# Patient Record
Sex: MALE | Race: WHITE | NOT HISPANIC OR LATINO | Employment: OTHER | URBAN - METROPOLITAN AREA
[De-identification: names, ages, dates, MRNs, and addresses within clinical notes are randomized per-mention and may not be internally consistent; named-entity substitution may affect disease eponyms.]

---

## 2019-09-20 ENCOUNTER — APPOINTMENT (OUTPATIENT)
Dept: RADIOLOGY | Facility: MEDICAL CENTER | Age: 64
DRG: 190 | End: 2019-09-20
Attending: INTERNAL MEDICINE
Payer: MEDICARE

## 2019-09-20 ENCOUNTER — HOSPITAL ENCOUNTER (INPATIENT)
Facility: MEDICAL CENTER | Age: 64
LOS: 1 days | DRG: 190 | End: 2019-09-22
Attending: EMERGENCY MEDICINE | Admitting: HOSPITALIST
Payer: MEDICARE

## 2019-09-20 ENCOUNTER — APPOINTMENT (OUTPATIENT)
Dept: RADIOLOGY | Facility: MEDICAL CENTER | Age: 64
DRG: 190 | End: 2019-09-20
Attending: EMERGENCY MEDICINE
Payer: MEDICARE

## 2019-09-20 DIAGNOSIS — J44.1 ACUTE EXACERBATION OF CHRONIC OBSTRUCTIVE PULMONARY DISEASE (COPD) (HCC): ICD-10-CM

## 2019-09-20 DIAGNOSIS — I10 ESSENTIAL HYPERTENSION: ICD-10-CM

## 2019-09-20 DIAGNOSIS — J96.01 ACUTE HYPOXEMIC RESPIRATORY FAILURE (HCC): ICD-10-CM

## 2019-09-20 DIAGNOSIS — E87.6 HYPOKALEMIA: ICD-10-CM

## 2019-09-20 DIAGNOSIS — I50.9 ACUTE CONGESTIVE HEART FAILURE, UNSPECIFIED HEART FAILURE TYPE (HCC): ICD-10-CM

## 2019-09-20 DIAGNOSIS — J44.1 COPD WITH EXACERBATION (HCC): ICD-10-CM

## 2019-09-20 DIAGNOSIS — Z72.0 TOBACCO ABUSE: ICD-10-CM

## 2019-09-20 DIAGNOSIS — J96.01 ACUTE RESPIRATORY FAILURE WITH HYPOXIA (HCC): ICD-10-CM

## 2019-09-20 PROBLEM — D75.89 MACROCYTOSIS WITHOUT ANEMIA: Status: ACTIVE | Noted: 2019-09-20

## 2019-09-20 PROBLEM — R79.89 ELEVATED TROPONIN: Status: ACTIVE | Noted: 2019-09-20

## 2019-09-20 PROBLEM — F11.11 HISTORY OF HEROIN ABUSE (HCC): Status: ACTIVE | Noted: 2019-09-20

## 2019-09-20 PROBLEM — E88.09 HYPERPROTEINEMIA: Status: ACTIVE | Noted: 2019-09-20

## 2019-09-20 PROBLEM — R74.01 TRANSAMINITIS: Status: ACTIVE | Noted: 2019-09-20

## 2019-09-20 PROBLEM — F10.29 ALCOHOL DEPENDENCE WITH UNSPECIFIED ALCOHOL-INDUCED DISORDER (HCC): Status: ACTIVE | Noted: 2019-09-20

## 2019-09-20 PROBLEM — E87.20 LACTIC ACIDOSIS: Status: ACTIVE | Noted: 2019-09-20

## 2019-09-20 LAB
ALBUMIN SERPL BCP-MCNC: 3.9 G/DL (ref 3.2–4.9)
ALBUMIN/GLOB SERPL: 0.9 G/DL
ALP SERPL-CCNC: 161 U/L (ref 30–99)
ALT SERPL-CCNC: 30 U/L (ref 2–50)
AMPHET UR QL SCN: NEGATIVE
ANION GAP SERPL CALC-SCNC: 14 MMOL/L (ref 0–11.9)
APPEARANCE UR: CLEAR
APTT PPP: 31.9 SEC (ref 24.7–36)
AST SERPL-CCNC: 114 U/L (ref 12–45)
BACTERIA #/AREA URNS HPF: NEGATIVE /HPF
BARBITURATES UR QL SCN: NEGATIVE
BASOPHILS # BLD AUTO: 1.2 % (ref 0–1.8)
BASOPHILS # BLD: 0.07 K/UL (ref 0–0.12)
BENZODIAZ UR QL SCN: NEGATIVE
BILIRUB SERPL-MCNC: 1.8 MG/DL (ref 0.1–1.5)
BILIRUB UR QL STRIP.AUTO: NEGATIVE
BUN SERPL-MCNC: 10 MG/DL (ref 8–22)
BZE UR QL SCN: NEGATIVE
CALCIUM SERPL-MCNC: 8.9 MG/DL (ref 8.5–10.5)
CANNABINOIDS UR QL SCN: POSITIVE
CHLORIDE SERPL-SCNC: 96 MMOL/L (ref 96–112)
CO2 SERPL-SCNC: 25 MMOL/L (ref 20–33)
COLOR UR: YELLOW
CREAT SERPL-MCNC: 0.83 MG/DL (ref 0.5–1.4)
EKG IMPRESSION: NORMAL
EOSINOPHIL # BLD AUTO: 0.08 K/UL (ref 0–0.51)
EOSINOPHIL NFR BLD: 1.4 % (ref 0–6.9)
EPI CELLS #/AREA URNS HPF: NEGATIVE /HPF
ERYTHROCYTE [DISTWIDTH] IN BLOOD BY AUTOMATED COUNT: 50.9 FL (ref 35.9–50)
GLOBULIN SER CALC-MCNC: 4.4 G/DL (ref 1.9–3.5)
GLUCOSE SERPL-MCNC: 186 MG/DL (ref 65–99)
GLUCOSE UR STRIP.AUTO-MCNC: 100 MG/DL
HAV IGM SERPL QL IA: NEGATIVE
HBV CORE IGM SER QL: NEGATIVE
HBV SURFACE AG SER QL: NEGATIVE
HCT VFR BLD AUTO: 39.4 % (ref 42–52)
HCV AB SER QL: REACTIVE
HGB BLD-MCNC: 13.4 G/DL (ref 14–18)
HIV 1+2 AB+HIV1 P24 AG SERPL QL IA: NON REACTIVE
HYALINE CASTS #/AREA URNS LPF: ABNORMAL /LPF
IMM GRANULOCYTES # BLD AUTO: 0.01 K/UL (ref 0–0.11)
IMM GRANULOCYTES NFR BLD AUTO: 0.2 % (ref 0–0.9)
INR PPP: 1.26 (ref 0.87–1.13)
KETONES UR STRIP.AUTO-MCNC: NEGATIVE MG/DL
LACTATE BLD-SCNC: 2.5 MMOL/L (ref 0.5–2)
LACTATE BLD-SCNC: 2.5 MMOL/L (ref 0.5–2)
LACTATE BLD-SCNC: 3 MMOL/L (ref 0.5–2)
LACTATE BLD-SCNC: 3.2 MMOL/L (ref 0.5–2)
LEUKOCYTE ESTERASE UR QL STRIP.AUTO: NEGATIVE
LYMPHOCYTES # BLD AUTO: 1.95 K/UL (ref 1–4.8)
LYMPHOCYTES NFR BLD: 34.1 % (ref 22–41)
MAGNESIUM SERPL-MCNC: 1.3 MG/DL (ref 1.5–2.5)
MCH RBC QN AUTO: 34.4 PG (ref 27–33)
MCHC RBC AUTO-ENTMCNC: 34 G/DL (ref 33.7–35.3)
MCV RBC AUTO: 101 FL (ref 81.4–97.8)
METHADONE UR QL SCN: NEGATIVE
MICRO URNS: ABNORMAL
MONOCYTES # BLD AUTO: 0.56 K/UL (ref 0–0.85)
MONOCYTES NFR BLD AUTO: 9.8 % (ref 0–13.4)
NEUTROPHILS # BLD AUTO: 3.05 K/UL (ref 1.82–7.42)
NEUTROPHILS NFR BLD: 53.3 % (ref 44–72)
NITRITE UR QL STRIP.AUTO: NEGATIVE
NRBC # BLD AUTO: 0 K/UL
NRBC BLD-RTO: 0 /100 WBC
NT-PROBNP SERPL IA-MCNC: 2416 PG/ML (ref 0–125)
OPIATES UR QL SCN: NEGATIVE
OXYCODONE UR QL SCN: NEGATIVE
PCP UR QL SCN: NEGATIVE
PH UR STRIP.AUTO: 7.5 [PH] (ref 5–8)
PHOSPHATE SERPL-MCNC: 4 MG/DL (ref 2.5–4.5)
PLATELET # BLD AUTO: 152 K/UL (ref 164–446)
PMV BLD AUTO: 11.1 FL (ref 9–12.9)
POTASSIUM SERPL-SCNC: 3.1 MMOL/L (ref 3.6–5.5)
PROCALCITONIN SERPL-MCNC: <0.05 NG/ML
PROPOXYPH UR QL SCN: NEGATIVE
PROT SERPL-MCNC: 8.3 G/DL (ref 6–8.2)
PROT UR QL STRIP: NEGATIVE MG/DL
PROTHROMBIN TIME: 16.1 SEC (ref 12–14.6)
RBC # BLD AUTO: 3.9 M/UL (ref 4.7–6.1)
RBC # URNS HPF: ABNORMAL /HPF
RBC UR QL AUTO: ABNORMAL
SODIUM SERPL-SCNC: 135 MMOL/L (ref 135–145)
SP GR UR STRIP.AUTO: 1.01
TROPONIN T SERPL-MCNC: 26 NG/L (ref 6–19)
TROPONIN T SERPL-MCNC: 43 NG/L (ref 6–19)
UROBILINOGEN UR STRIP.AUTO-MCNC: 1 MG/DL
WBC # BLD AUTO: 5.7 K/UL (ref 4.8–10.8)
WBC #/AREA URNS HPF: ABNORMAL /HPF

## 2019-09-20 PROCEDURE — 94640 AIRWAY INHALATION TREATMENT: CPT

## 2019-09-20 PROCEDURE — 36415 COLL VENOUS BLD VENIPUNCTURE: CPT

## 2019-09-20 PROCEDURE — 700111 HCHG RX REV CODE 636 W/ 250 OVERRIDE (IP): Performed by: EMERGENCY MEDICINE

## 2019-09-20 PROCEDURE — A9270 NON-COVERED ITEM OR SERVICE: HCPCS | Performed by: INTERNAL MEDICINE

## 2019-09-20 PROCEDURE — HZ2ZZZZ DETOXIFICATION SERVICES FOR SUBSTANCE ABUSE TREATMENT: ICD-10-PCS | Performed by: INTERNAL MEDICINE

## 2019-09-20 PROCEDURE — 80074 ACUTE HEPATITIS PANEL: CPT

## 2019-09-20 PROCEDURE — 700102 HCHG RX REV CODE 250 W/ 637 OVERRIDE(OP): Performed by: INTERNAL MEDICINE

## 2019-09-20 PROCEDURE — G0378 HOSPITAL OBSERVATION PER HR: HCPCS

## 2019-09-20 PROCEDURE — 700101 HCHG RX REV CODE 250: Performed by: INTERNAL MEDICINE

## 2019-09-20 PROCEDURE — 96372 THER/PROPH/DIAG INJ SC/IM: CPT

## 2019-09-20 PROCEDURE — 94668 MNPJ CHEST WALL SBSQ: CPT

## 2019-09-20 PROCEDURE — 93970 EXTREMITY STUDY: CPT

## 2019-09-20 PROCEDURE — 94667 MNPJ CHEST WALL 1ST: CPT

## 2019-09-20 PROCEDURE — 96374 THER/PROPH/DIAG INJ IV PUSH: CPT

## 2019-09-20 PROCEDURE — 96365 THER/PROPH/DIAG IV INF INIT: CPT

## 2019-09-20 PROCEDURE — 83605 ASSAY OF LACTIC ACID: CPT | Mod: 91

## 2019-09-20 PROCEDURE — 700111 HCHG RX REV CODE 636 W/ 250 OVERRIDE (IP): Performed by: INTERNAL MEDICINE

## 2019-09-20 PROCEDURE — 85730 THROMBOPLASTIN TIME PARTIAL: CPT

## 2019-09-20 PROCEDURE — 87522 HEPATITIS C REVRS TRNSCRPJ: CPT

## 2019-09-20 PROCEDURE — 700105 HCHG RX REV CODE 258: Performed by: INTERNAL MEDICINE

## 2019-09-20 PROCEDURE — 94760 N-INVAS EAR/PLS OXIMETRY 1: CPT

## 2019-09-20 PROCEDURE — 81001 URINALYSIS AUTO W/SCOPE: CPT

## 2019-09-20 PROCEDURE — 84145 PROCALCITONIN (PCT): CPT

## 2019-09-20 PROCEDURE — 84484 ASSAY OF TROPONIN QUANT: CPT

## 2019-09-20 PROCEDURE — 700102 HCHG RX REV CODE 250 W/ 637 OVERRIDE(OP): Performed by: EMERGENCY MEDICINE

## 2019-09-20 PROCEDURE — 80053 COMPREHEN METABOLIC PANEL: CPT

## 2019-09-20 PROCEDURE — G0475 HIV COMBINATION ASSAY: HCPCS

## 2019-09-20 PROCEDURE — 96366 THER/PROPH/DIAG IV INF ADDON: CPT

## 2019-09-20 PROCEDURE — 83880 ASSAY OF NATRIURETIC PEPTIDE: CPT

## 2019-09-20 PROCEDURE — 99220 PR INITIAL OBSERVATION CARE,LEVL III: CPT | Performed by: INTERNAL MEDICINE

## 2019-09-20 PROCEDURE — 83735 ASSAY OF MAGNESIUM: CPT

## 2019-09-20 PROCEDURE — 71275 CT ANGIOGRAPHY CHEST: CPT

## 2019-09-20 PROCEDURE — 93005 ELECTROCARDIOGRAM TRACING: CPT | Performed by: EMERGENCY MEDICINE

## 2019-09-20 PROCEDURE — 80307 DRUG TEST PRSMV CHEM ANLYZR: CPT

## 2019-09-20 PROCEDURE — 87040 BLOOD CULTURE FOR BACTERIA: CPT | Mod: 91

## 2019-09-20 PROCEDURE — A9270 NON-COVERED ITEM OR SERVICE: HCPCS | Performed by: EMERGENCY MEDICINE

## 2019-09-20 PROCEDURE — 85610 PROTHROMBIN TIME: CPT

## 2019-09-20 PROCEDURE — 96375 TX/PRO/DX INJ NEW DRUG ADDON: CPT

## 2019-09-20 PROCEDURE — 71045 X-RAY EXAM CHEST 1 VIEW: CPT

## 2019-09-20 PROCEDURE — 76705 ECHO EXAM OF ABDOMEN: CPT

## 2019-09-20 PROCEDURE — 85025 COMPLETE CBC W/AUTO DIFF WBC: CPT

## 2019-09-20 PROCEDURE — 99285 EMERGENCY DEPT VISIT HI MDM: CPT

## 2019-09-20 PROCEDURE — 700101 HCHG RX REV CODE 250: Performed by: EMERGENCY MEDICINE

## 2019-09-20 PROCEDURE — 700117 HCHG RX CONTRAST REV CODE 255: Performed by: INTERNAL MEDICINE

## 2019-09-20 PROCEDURE — 84100 ASSAY OF PHOSPHORUS: CPT

## 2019-09-20 RX ORDER — ENALAPRILAT 1.25 MG/ML
1.25 INJECTION INTRAVENOUS EVERY 6 HOURS PRN
Status: DISCONTINUED | OUTPATIENT
Start: 2019-09-20 | End: 2019-09-22 | Stop reason: HOSPADM

## 2019-09-20 RX ORDER — AMOXICILLIN 250 MG
2 CAPSULE ORAL 2 TIMES DAILY
Status: DISCONTINUED | OUTPATIENT
Start: 2019-09-20 | End: 2019-09-22 | Stop reason: HOSPADM

## 2019-09-20 RX ORDER — BUPRENORPHINE HYDROCHLORIDE AND NALOXONE HYDROCHLORIDE DIHYDRATE 8; 2 MG/1; MG/1
1 TABLET SUBLINGUAL 2 TIMES DAILY
Status: DISCONTINUED | OUTPATIENT
Start: 2019-09-20 | End: 2019-09-22 | Stop reason: HOSPADM

## 2019-09-20 RX ORDER — POTASSIUM CHLORIDE 20 MEQ/1
20 TABLET, EXTENDED RELEASE ORAL ONCE
Status: COMPLETED | OUTPATIENT
Start: 2019-09-20 | End: 2019-09-20

## 2019-09-20 RX ORDER — CLONIDINE HYDROCHLORIDE 0.1 MG/1
0.1 TABLET ORAL
Status: DISCONTINUED | OUTPATIENT
Start: 2019-09-20 | End: 2019-09-22 | Stop reason: HOSPADM

## 2019-09-20 RX ORDER — MAGNESIUM SULFATE HEPTAHYDRATE 40 MG/ML
4 INJECTION, SOLUTION INTRAVENOUS ONCE
Status: COMPLETED | OUTPATIENT
Start: 2019-09-20 | End: 2019-09-21

## 2019-09-20 RX ORDER — ACETAMINOPHEN 325 MG/1
650 TABLET ORAL EVERY 6 HOURS PRN
Status: DISCONTINUED | OUTPATIENT
Start: 2019-09-20 | End: 2019-09-22 | Stop reason: HOSPADM

## 2019-09-20 RX ORDER — LISINOPRIL 5 MG/1
10 TABLET ORAL DAILY
Status: DISCONTINUED | OUTPATIENT
Start: 2019-09-20 | End: 2019-09-22 | Stop reason: HOSPADM

## 2019-09-20 RX ORDER — SUCRALFATE ORAL 1 G/10ML
1 SUSPENSION ORAL EVERY 6 HOURS
Status: DISCONTINUED | OUTPATIENT
Start: 2019-09-20 | End: 2019-09-22 | Stop reason: HOSPADM

## 2019-09-20 RX ORDER — BISACODYL 10 MG
10 SUPPOSITORY, RECTAL RECTAL
Status: DISCONTINUED | OUTPATIENT
Start: 2019-09-20 | End: 2019-09-22 | Stop reason: HOSPADM

## 2019-09-20 RX ORDER — ASPIRIN 300 MG/1
300 SUPPOSITORY RECTAL DAILY
Status: DISCONTINUED | OUTPATIENT
Start: 2019-09-20 | End: 2019-09-22 | Stop reason: HOSPADM

## 2019-09-20 RX ORDER — GABAPENTIN 300 MG/1
600 CAPSULE ORAL 3 TIMES DAILY
Status: DISCONTINUED | OUTPATIENT
Start: 2019-09-20 | End: 2019-09-22 | Stop reason: HOSPADM

## 2019-09-20 RX ORDER — BUPRENORPHINE HYDROCHLORIDE AND NALOXONE HYDROCHLORIDE DIHYDRATE 8; 2 MG/1; MG/1
1 TABLET SUBLINGUAL 2 TIMES DAILY
COMMUNITY

## 2019-09-20 RX ORDER — LORAZEPAM 2 MG/1
2 TABLET ORAL
Status: DISCONTINUED | OUTPATIENT
Start: 2019-09-20 | End: 2019-09-22 | Stop reason: HOSPADM

## 2019-09-20 RX ORDER — LORAZEPAM 1 MG/1
0.5 TABLET ORAL EVERY 4 HOURS PRN
Status: DISCONTINUED | OUTPATIENT
Start: 2019-09-20 | End: 2019-09-22 | Stop reason: HOSPADM

## 2019-09-20 RX ORDER — SODIUM CHLORIDE, SODIUM LACTATE, POTASSIUM CHLORIDE, AND CALCIUM CHLORIDE .6; .31; .03; .02 G/100ML; G/100ML; G/100ML; G/100ML
500 INJECTION, SOLUTION INTRAVENOUS
Status: DISCONTINUED | OUTPATIENT
Start: 2019-09-20 | End: 2019-09-22 | Stop reason: HOSPADM

## 2019-09-20 RX ORDER — LORAZEPAM 2 MG/ML
0.5 INJECTION INTRAMUSCULAR EVERY 4 HOURS PRN
Status: DISCONTINUED | OUTPATIENT
Start: 2019-09-20 | End: 2019-09-22 | Stop reason: HOSPADM

## 2019-09-20 RX ORDER — GABAPENTIN 600 MG/1
600 TABLET ORAL 3 TIMES DAILY
COMMUNITY

## 2019-09-20 RX ORDER — LORAZEPAM 2 MG/1
4 TABLET ORAL
Status: DISCONTINUED | OUTPATIENT
Start: 2019-09-20 | End: 2019-09-22 | Stop reason: HOSPADM

## 2019-09-20 RX ORDER — DOXYCYCLINE 100 MG/1
100 TABLET ORAL EVERY 12 HOURS
Status: DISCONTINUED | OUTPATIENT
Start: 2019-09-20 | End: 2019-09-22 | Stop reason: HOSPADM

## 2019-09-20 RX ORDER — LORAZEPAM 1 MG/1
1 TABLET ORAL EVERY 4 HOURS PRN
Status: DISCONTINUED | OUTPATIENT
Start: 2019-09-20 | End: 2019-09-22 | Stop reason: HOSPADM

## 2019-09-20 RX ORDER — ONDANSETRON 2 MG/ML
4 INJECTION INTRAMUSCULAR; INTRAVENOUS EVERY 4 HOURS PRN
Status: DISCONTINUED | OUTPATIENT
Start: 2019-09-20 | End: 2019-09-20

## 2019-09-20 RX ORDER — LORAZEPAM 2 MG/ML
1.5 INJECTION INTRAMUSCULAR
Status: DISCONTINUED | OUTPATIENT
Start: 2019-09-20 | End: 2019-09-22 | Stop reason: HOSPADM

## 2019-09-20 RX ORDER — LISINOPRIL 10 MG/1
10 TABLET ORAL ONCE
Status: COMPLETED | OUTPATIENT
Start: 2019-09-20 | End: 2019-09-20

## 2019-09-20 RX ORDER — ASPIRIN 81 MG/1
324 TABLET, CHEWABLE ORAL DAILY
Status: DISCONTINUED | OUTPATIENT
Start: 2019-09-20 | End: 2019-09-22 | Stop reason: HOSPADM

## 2019-09-20 RX ORDER — CLONIDINE 0.3 MG/24H
1 PATCH, EXTENDED RELEASE TRANSDERMAL
COMMUNITY
End: 2019-09-20

## 2019-09-20 RX ORDER — ONDANSETRON 4 MG/1
4 TABLET, ORALLY DISINTEGRATING ORAL EVERY 4 HOURS PRN
Status: DISCONTINUED | OUTPATIENT
Start: 2019-09-20 | End: 2019-09-20

## 2019-09-20 RX ORDER — CLONIDINE HYDROCHLORIDE 0.3 MG/1
0.3 TABLET ORAL 2 TIMES DAILY
COMMUNITY

## 2019-09-20 RX ORDER — IPRATROPIUM BROMIDE AND ALBUTEROL SULFATE 2.5; .5 MG/3ML; MG/3ML
3 SOLUTION RESPIRATORY (INHALATION)
Status: DISCONTINUED | OUTPATIENT
Start: 2019-09-20 | End: 2019-09-21

## 2019-09-20 RX ORDER — NICOTINE 21 MG/24HR
14 PATCH, TRANSDERMAL 24 HOURS TRANSDERMAL
Status: DISCONTINUED | OUTPATIENT
Start: 2019-09-20 | End: 2019-09-22 | Stop reason: HOSPADM

## 2019-09-20 RX ORDER — THIAMINE MONONITRATE (VIT B1) 100 MG
100 TABLET ORAL DAILY
Status: DISCONTINUED | OUTPATIENT
Start: 2019-09-21 | End: 2019-09-22 | Stop reason: HOSPADM

## 2019-09-20 RX ORDER — PREDNISONE 50 MG/1
50 TABLET ORAL DAILY
Status: DISCONTINUED | OUTPATIENT
Start: 2019-09-20 | End: 2019-09-22 | Stop reason: HOSPADM

## 2019-09-20 RX ORDER — CLONIDINE HYDROCHLORIDE 0.1 MG/1
0.3 TABLET ORAL 2 TIMES DAILY
Status: DISCONTINUED | OUTPATIENT
Start: 2019-09-20 | End: 2019-09-22 | Stop reason: HOSPADM

## 2019-09-20 RX ORDER — POLYETHYLENE GLYCOL 3350 17 G/17G
1 POWDER, FOR SOLUTION ORAL
Status: DISCONTINUED | OUTPATIENT
Start: 2019-09-20 | End: 2019-09-22 | Stop reason: HOSPADM

## 2019-09-20 RX ORDER — IPRATROPIUM BROMIDE AND ALBUTEROL SULFATE 2.5; .5 MG/3ML; MG/3ML
3 SOLUTION RESPIRATORY (INHALATION)
Status: DISCONTINUED | OUTPATIENT
Start: 2019-09-20 | End: 2019-09-20

## 2019-09-20 RX ORDER — LORAZEPAM 2 MG/ML
1 INJECTION INTRAMUSCULAR
Status: DISCONTINUED | OUTPATIENT
Start: 2019-09-20 | End: 2019-09-22 | Stop reason: HOSPADM

## 2019-09-20 RX ORDER — OMEPRAZOLE 20 MG/1
20 CAPSULE, DELAYED RELEASE ORAL 2 TIMES DAILY
Status: DISCONTINUED | OUTPATIENT
Start: 2019-09-20 | End: 2019-09-22 | Stop reason: HOSPADM

## 2019-09-20 RX ORDER — ASPIRIN 325 MG
325 TABLET ORAL DAILY
Status: DISCONTINUED | OUTPATIENT
Start: 2019-09-20 | End: 2019-09-22 | Stop reason: HOSPADM

## 2019-09-20 RX ORDER — METHYLPREDNISOLONE SODIUM SUCCINATE 125 MG/2ML
125 INJECTION, POWDER, LYOPHILIZED, FOR SOLUTION INTRAMUSCULAR; INTRAVENOUS ONCE
Status: COMPLETED | OUTPATIENT
Start: 2019-09-20 | End: 2019-09-20

## 2019-09-20 RX ORDER — FOLIC ACID 1 MG/1
1 TABLET ORAL DAILY
Status: DISCONTINUED | OUTPATIENT
Start: 2019-09-20 | End: 2019-09-22 | Stop reason: HOSPADM

## 2019-09-20 RX ORDER — POTASSIUM CHLORIDE 20 MEQ/1
40 TABLET, EXTENDED RELEASE ORAL EVERY 4 HOURS
Status: DISPENSED | OUTPATIENT
Start: 2019-09-20 | End: 2019-09-20

## 2019-09-20 RX ORDER — LORAZEPAM 2 MG/ML
2 INJECTION INTRAMUSCULAR
Status: DISCONTINUED | OUTPATIENT
Start: 2019-09-20 | End: 2019-09-22 | Stop reason: HOSPADM

## 2019-09-20 RX ORDER — LISINOPRIL 10 MG/1
10 TABLET ORAL DAILY
COMMUNITY

## 2019-09-20 RX ADMIN — GABAPENTIN 600 MG: 300 CAPSULE ORAL at 14:21

## 2019-09-20 RX ADMIN — DOXYCYCLINE 100 MG: 100 TABLET, FILM COATED ORAL at 18:19

## 2019-09-20 RX ADMIN — DOXYCYCLINE 100 MG: 100 TABLET, FILM COATED ORAL at 14:42

## 2019-09-20 RX ADMIN — BUPRENORPHINE HYDROCHLORIDE AND NALOXONE HYDROCHLORIDE DIHYDRATE 1 TABLET: 8; 2 TABLET SUBLINGUAL at 18:33

## 2019-09-20 RX ADMIN — ACETAMINOPHEN 650 MG: 325 TABLET, FILM COATED ORAL at 20:36

## 2019-09-20 RX ADMIN — THERA TABS 1 TABLET: TAB at 14:40

## 2019-09-20 RX ADMIN — ASPIRIN 81 MG 324 MG: 81 TABLET ORAL at 14:40

## 2019-09-20 RX ADMIN — SUCRALFATE 1 G: 1 SUSPENSION ORAL at 14:22

## 2019-09-20 RX ADMIN — LORAZEPAM 0.5 MG: 1 TABLET ORAL at 20:36

## 2019-09-20 RX ADMIN — OMEPRAZOLE 20 MG: 20 CAPSULE, DELAYED RELEASE ORAL at 14:41

## 2019-09-20 RX ADMIN — NICOTINE 14 MG: 14 PATCH TRANSDERMAL at 14:42

## 2019-09-20 RX ADMIN — MAGNESIUM SULFATE IN WATER 4 G: 40 INJECTION, SOLUTION INTRAVENOUS at 20:36

## 2019-09-20 RX ADMIN — SUCRALFATE 1 G: 1 SUSPENSION ORAL at 18:18

## 2019-09-20 RX ADMIN — CLONIDINE HYDROCHLORIDE 0.3 MG: 0.1 TABLET ORAL at 14:41

## 2019-09-20 RX ADMIN — POTASSIUM CHLORIDE 20 MEQ: 20 TABLET, EXTENDED RELEASE ORAL at 09:12

## 2019-09-20 RX ADMIN — SENNOSIDES, DOCUSATE SODIUM 2 TABLET: 50; 8.6 TABLET, FILM COATED ORAL at 18:20

## 2019-09-20 RX ADMIN — IPRATROPIUM BROMIDE AND ALBUTEROL SULFATE 3 ML: .5; 3 SOLUTION RESPIRATORY (INHALATION) at 19:28

## 2019-09-20 RX ADMIN — IPRATROPIUM BROMIDE AND ALBUTEROL SULFATE 3 ML: .5; 3 SOLUTION RESPIRATORY (INHALATION) at 14:26

## 2019-09-20 RX ADMIN — IOHEXOL 100 ML: 350 INJECTION, SOLUTION INTRAVENOUS at 16:05

## 2019-09-20 RX ADMIN — OMEPRAZOLE 20 MG: 20 CAPSULE, DELAYED RELEASE ORAL at 18:20

## 2019-09-20 RX ADMIN — LISINOPRIL 10 MG: 10 TABLET ORAL at 08:35

## 2019-09-20 RX ADMIN — CLONIDINE HYDROCHLORIDE 0.3 MG: 0.1 TABLET ORAL at 18:19

## 2019-09-20 RX ADMIN — METHYLPREDNISOLONE SODIUM SUCCINATE 125 MG: 125 INJECTION, POWDER, FOR SOLUTION INTRAMUSCULAR; INTRAVENOUS at 08:01

## 2019-09-20 RX ADMIN — ALBUTEROL SULFATE 2.5 MG: 2.5 SOLUTION RESPIRATORY (INHALATION) at 08:48

## 2019-09-20 RX ADMIN — LORAZEPAM 0.5 MG: 2 INJECTION INTRAMUSCULAR; INTRAVENOUS at 12:44

## 2019-09-20 RX ADMIN — PREDNISONE 50 MG: 50 TABLET ORAL at 14:42

## 2019-09-20 RX ADMIN — ENOXAPARIN SODIUM 40 MG: 100 INJECTION SUBCUTANEOUS at 14:45

## 2019-09-20 RX ADMIN — GABAPENTIN 600 MG: 300 CAPSULE ORAL at 18:20

## 2019-09-20 RX ADMIN — THIAMINE HYDROCHLORIDE 100 MG: 100 INJECTION, SOLUTION INTRAMUSCULAR; INTRAVENOUS at 17:22

## 2019-09-20 RX ADMIN — LISINOPRIL 10 MG: 5 TABLET ORAL at 10:45

## 2019-09-20 RX ADMIN — POTASSIUM CHLORIDE 40 MEQ: 20 TABLET, EXTENDED RELEASE ORAL at 14:22

## 2019-09-20 RX ADMIN — FOLIC ACID 1 MG: 1 TABLET ORAL at 14:46

## 2019-09-20 SDOH — HEALTH STABILITY: MENTAL HEALTH: HOW MANY STANDARD DRINKS CONTAINING ALCOHOL DO YOU HAVE ON A TYPICAL DAY?: 3 OR 4

## 2019-09-20 SDOH — HEALTH STABILITY: MENTAL HEALTH: HOW OFTEN DO YOU HAVE A DRINK CONTAINING ALCOHOL?: 4 OR MORE TIMES A WEEK

## 2019-09-20 SDOH — HEALTH STABILITY: MENTAL HEALTH: HOW OFTEN DO YOU HAVE 6 OR MORE DRINKS ON ONE OCCASION?: WEEKLY

## 2019-09-20 ASSESSMENT — COPD QUESTIONNAIRES
COPD SCREENING SCORE: 7
DURING THE PAST 4 WEEKS HOW MUCH DID YOU FEEL SHORT OF BREATH: NONE/LITTLE OF THE TIME
DO YOU EVER COUGH UP ANY MUCUS OR PHLEGM?: YES, A FEW DAYS A WEEK OR MONTH
HAVE YOU SMOKED AT LEAST 100 CIGARETTES IN YOUR ENTIRE LIFE: YES

## 2019-09-20 ASSESSMENT — ENCOUNTER SYMPTOMS
HEMOPTYSIS: 0
VOMITING: 0
ORTHOPNEA: 0
SPUTUM PRODUCTION: 1
BLOOD IN STOOL: 0
FLANK PAIN: 0
DEPRESSION: 0
DOUBLE VISION: 0
SHORTNESS OF BREATH: 1
FALLS: 0
PALPITATIONS: 0
CHILLS: 0
WHEEZING: 1
DIAPHORESIS: 0
BACK PAIN: 0
BLURRED VISION: 0
COUGH: 1
HEADACHES: 0
MYALGIAS: 1
NECK PAIN: 0
WEIGHT LOSS: 0
NERVOUS/ANXIOUS: 1
DIZZINESS: 0
NAUSEA: 0
ABDOMINAL PAIN: 0
CLAUDICATION: 0
PND: 0
FEVER: 0
HEARTBURN: 0
DIARRHEA: 0
CONSTIPATION: 0

## 2019-09-20 ASSESSMENT — LIFESTYLE VARIABLES
TOTAL SCORE: 8
ORIENTATION AND CLOUDING OF SENSORIUM: ORIENTED AND CAN DO SERIAL ADDITIONS
AGITATION: SOMEWHAT MORE THAN NORMAL ACTIVITY
TREMOR: *
HEADACHE, FULLNESS IN HEAD: NOT PRESENT
AGITATION: SOMEWHAT MORE THAN NORMAL ACTIVITY
TOTAL SCORE: 2
PAROXYSMAL SWEATS: NO SWEAT VISIBLE
DOES PATIENT WANT TO TALK TO SOMEONE ABOUT QUITTING: YES
TOTAL SCORE: 4
EVER_SMOKED: YES
NAUSEA AND VOMITING: MILD NAUSEA WITH NO VOMITING
NAUSEA AND VOMITING: NO NAUSEA AND NO VOMITING
AGITATION: SOMEWHAT MORE THAN NORMAL ACTIVITY
HEADACHE, FULLNESS IN HEAD: MILD
EVER HAD A DRINK FIRST THING IN THE MORNING TO STEADY YOUR NERVES TO GET RID OF A HANGOVER: YES
HAVE PEOPLE ANNOYED YOU BY CRITICIZING YOUR DRINKING: YES
ANXIETY: *
EVER_SMOKED: YES
AUDITORY DISTURBANCES: NOT PRESENT
SUBSTANCE_ABUSE: 1
ORIENTATION AND CLOUDING OF SENSORIUM: ORIENTED AND CAN DO SERIAL ADDITIONS
ANXIETY: *
PAROXYSMAL SWEATS: NO SWEAT VISIBLE
AUDITORY DISTURBANCES: NOT PRESENT
ANXIETY: MILDLY ANXIOUS
VISUAL DISTURBANCES: NOT PRESENT
VISUAL DISTURBANCES: NOT PRESENT
HOW MANY TIMES IN THE PAST YEAR HAVE YOU HAD 5 OR MORE DRINKS IN A DAY: 200
TREMOR: TREMOR NOT VISIBLE BUT CAN BE FELT, FINGERTIP TO FINGERTIP
TREMOR: NO TREMOR
NAUSEA AND VOMITING: NO NAUSEA AND NO VOMITING
ORIENTATION AND CLOUDING OF SENSORIUM: ORIENTED AND CAN DO SERIAL ADDITIONS
AUDITORY DISTURBANCES: NOT PRESENT
CONSUMPTION TOTAL: POSITIVE
AVERAGE NUMBER OF DAYS PER WEEK YOU HAVE A DRINK CONTAINING ALCOHOL: 7
TOTAL SCORE: 4
HAVE YOU EVER FELT YOU SHOULD CUT DOWN ON YOUR DRINKING: YES
DO YOU DRINK ALCOHOL: YES
TOTAL SCORE: 6
TOTAL SCORE: 4
HEADACHE, FULLNESS IN HEAD: NOT PRESENT
ON A TYPICAL DAY WHEN YOU DRINK ALCOHOL HOW MANY DRINKS DO YOU HAVE: 4
VISUAL DISTURBANCES: NOT PRESENT
PAROXYSMAL SWEATS: BARELY PERCEPTIBLE SWEATING. PALMS MOIST
EVER FELT BAD OR GUILTY ABOUT YOUR DRINKING: YES
DOES PATIENT WANT TO STOP DRINKING: YES

## 2019-09-20 NOTE — ED NOTES
Med Rec completed per patient and home pharmacy   Allergies reviewed  No ORAL antibiotics in last 14 days    Suboxone has been verified with pt's home pharmacy

## 2019-09-20 NOTE — ED NOTES
Rounded on pt, discussed POC, updated on wait status, answered questions, addressed needs, ensured call light within reach. Provided emotional support for pt.

## 2019-09-20 NOTE — H&P
Hospital Medicine History & Physical Note    Date of Service  9/20/2019    Primary Care Physician  Pcp Pt States None    Consultants  None    Code Status  FULL CODE     Chief Complaint  Shortness of breath     History of Presenting Illness  64 y.o. male who presented 9/20/2019 with Shortness of breath.     Patient presents to the emergency department today complaining of shortness of breath, cough.  Patient reports currently he is homeless, he is living and has a bed at the overflow shelter here in [facility].  Patient reports that he is always moving from one time to another, he moved here from [facility] and prior to that he was living in [place].  Prior to that he was in [facility].  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  Further, he reports underlying history of COPD, not on oxygen.  Reports history of hypertension, reports being permanently disabled from lumbar radiculopathy/sciatica and having chronic degenerative joint disease.  He denies any history of congestive heart failure.  Patient reports that last year he was hospitalized 6 times to hospital in [facility] for recurrent pneumonia.    At this time patient reports that he has been having a productive cough, productive of yellow-colored sputum over the last 2 days.  In addition over the last 24 hours he has been getting short of breath, developing dyspnea on exertion which is progressive.  Also over the last 24 hours he has noticed wheezing.  He denies any chest pain.  He denies any fevers but reports that he has felt chills.  Denies headaches.  Otherwise denies any lower extremity edema but reports pain and cramps in his lower extremities, more profound in the left lower extremity.  He came to [facility] by Timothy gallo.  Denies any personal history of DVT/pulmonary embolism.  Denies any pleuritic symptoms at this time.  Denies any personal history of coronary  artery disease/congestive heart failure.  Reports that he is an active smoker and smokes 5 cigarettes/day.  Otherwise his shortness of breath is more profound with ambulation/exertion.  No orthopnea or paroxysmal nocturnal dyspnea reported by the patient and he has not noticed any lower extremity edema.  Denies having any palpitations.  Has not noticed any chest pain.  Denies having any abdominal pain, diarrhea, constipation, GI/genitourinary complaints otherwise.    Concern regarding his symptoms and thought that he may be having a similar pneumonia as he had last year while he was in [place] and decided to present to the emergency department for further evaluation.  He reports following his discharge he would need evaluation by her primary care physician also because he is new in town.  He was diagnosed as an acute exacerbation of COPD with concerns for congestive heart failure and respiratory failure and hospital medicine was advised for evaluation for hospitalization needs.    Otherwise patient reports that he uses lisinopril, clonidine for his blood pressure is chronically on Suboxone.    He reports that he has a history of alcoholism, does not drink daily alcohol, denies history of withdrawal.  Reports that he drinks 1-2 beers per day.  Denies any illicit drugs of abuse, denies marijuana consumption, denies methamphetamine consumption or any other illicit drugs of abuse.  Ongoing nicotine dependence as reviewed above.    Review of Systems  Review of Systems   Constitutional: Positive for malaise/fatigue. Negative for chills, diaphoresis, fever and weight loss.   HENT: Negative for hearing loss and tinnitus.    Eyes: Negative for blurred vision and double vision.   Respiratory: Positive for cough, sputum production, shortness of breath and wheezing. Negative for hemoptysis.    Cardiovascular: Negative for chest pain, palpitations, orthopnea, claudication, leg swelling and PND.   Gastrointestinal: Negative for  abdominal pain, blood in stool, constipation, diarrhea, heartburn, melena, nausea and vomiting.   Genitourinary: Negative for dysuria, flank pain, frequency, hematuria and urgency.   Musculoskeletal: Positive for myalgias. Negative for back pain, falls, joint pain and neck pain.   Skin: Negative for itching and rash.   Neurological: Negative for dizziness and headaches.   Psychiatric/Behavioral: Positive for substance abuse (Alcohol - history herone). Negative for depression and suicidal ideas. The patient is nervous/anxious.        Past Medical History   has a past medical history of Chronic obstructive pulmonary disease (HCC), ETOH abuse, and Hypertension.    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 He has been smoking about 0.50 packs per day. He has never used smokeless tobacco. He reports that he drinks about 2.4 oz of alcohol per week. He reports that he does not use drugs.    Allergies  No Known Allergies    Medications  Prior to Admission Medications   Prescriptions Last Dose Informant Patient Reported? Taking?   B Complex Vitamins (VITAMIN B COMPLEX PO) ABOUT 1 WEEK AGO at OUT Patient Yes Yes   Sig: Take 1 Tab by mouth every day.   Multiple Vitamin (MULTI VITAMIN PO) ABOUT 1 WEEK AGO at OUT Patient Yes Yes   Sig: Take 1 Tab by mouth every day.   buprenorphine-naloxone (SUBOXONE) 8-2 MG SL Tab 9/20/2019 at AM Patient Yes Yes   Sig: Place 1 Tab under tongue 2 Times a Day.   cloNIDine (CATAPRESS) 0.3 MG Tab ABOUT 1 WEEK AGO at OUT Patient Yes Yes   Sig: Take 0.3 mg by mouth 2 times a day.   gabapentin (NEURONTIN) 600 MG tablet ABOUT 1 WEEK AGO at OUT Patient Yes Yes   Sig: Take 600 mg by mouth  3 times a day.   lisinopril (PRINIVIL) 10 MG Tab ABOUT 1 WEEK AGO at OUT Patient Yes Yes   Sig: Take 10 mg by mouth every day.      Facility-Administered Medications: None       Physical Exam  Temp:  [35.9 °C (96.7 °F)] 35.9 °C (96.7 °F)  Pulse:  [] 94  Resp:  [10-18] 11  BP: (152-186)/() 182/88  SpO2:  [92 %-99 %] 96 %    Physical Exam   Constitutional: He is oriented to person, place, and time. He appears well-developed and well-nourished. No distress.   HENT:   Head: Normocephalic.   Mouth/Throat: Oropharynx is clear and moist. No oropharyngeal exudate.   Eyes: Pupils are equal, round, and reactive to light. Conjunctivae and EOM are normal. No scleral icterus.   Neck: Normal range of motion. No JVD present. No thyromegaly present.   Cardiovascular: Normal rate and regular rhythm.   Murmur (LSB ?) heard.  Pulmonary/Chest: Effort normal. No stridor. No respiratory distress. He has wheezes. He has rales.   Abdominal: Soft. Bowel sounds are normal. He exhibits no distension. There is no tenderness. There is no rebound and no guarding.   Musculoskeletal: He exhibits tenderness (L leg calf area). He exhibits no edema.   Lymphadenopathy:     He has no cervical adenopathy.   Neurological: He is alert and oriented to person, place, and time. He has normal reflexes. No cranial nerve deficit.   Skin: Skin is warm and dry.   Psychiatric: He has a normal mood and affect. His behavior is normal. Judgment and thought content normal.       Laboratory:  Recent Labs     09/20/19  0659   WBC 5.7   RBC 3.90*   HEMOGLOBIN 13.4*   HEMATOCRIT 39.4*   .0*   MCH 34.4*   MCHC 34.0   RDW 50.9*   PLATELETCT 152*   MPV 11.1     Recent Labs     09/20/19  0659   SODIUM 135   POTASSIUM 3.1*   CHLORIDE 96   CO2 25   GLUCOSE 186*   BUN 10   CREATININE 0.83   CALCIUM 8.9     Recent Labs     09/20/19  0659   ALTSGPT 30   ASTSGOT 114*   ALKPHOSPHAT 161*   TBILIRUBIN 1.8*   GLUCOSE 186*         Recent Labs     09/20/19  0659    NTPROBNP 2416*         Recent Labs     09/20/19  0659   TROPONINT 43*       Urinalysis:    Recent Labs     09/20/19  0811   SPECGRAVITY 1.009   GLUCOSEUR 100*   KETONES Negative   NITRITE Negative   LEUKESTERAS Negative   WBCURINE 0-2*   RBCURINE 2-5*   BACTERIA Negative   EPITHELCELL Negative        Imaging:  DX-CHEST-PORTABLE (1 VIEW)   Final Result         1.  No acute cardiopulmonary disease.   2.  Atherosclerosis   3.  Hyperexpansion of lungs favors changes of COPD.      US-RUQ    (Results Pending)   EC-ECHOCARDIOGRAM COMPLETE W/O CONT    (Results Pending)   CT-CTA CHEST PULMONARY ARTERY W/ RECONS    (Results Pending)   US-EXTREMITY VENOUS LOWER BILAT    (Results Pending)         Assessment/Plan:  I anticipate this patient is appropriate for observation status at this time.    Acute hypoxemic respiratory failure (HCC)- (present on admission)  Assessment & Plan  Differential diagnosis considered by me include: This been secondary to underlying acute COPD exacerbation, acute venous thromboembolic disease, consideration for cardiac issues including consideration towards congestive heart failure.  Infectious etiologies appear less likely.    EKG obtained on presentation has been personally reviewed and interpreted by me, profound left ventricular hypertrophy otherwise no findings of acute ischemia or infarction on this EKG.  No findings concerning for right ventricular dysfunction.    Chest x-ray obtained on presentation has been personally reviewed and interpreted by me, emphysematous lungs without any other acute concerns based upon my evaluation.    Negative procalcitonin, no evidence of leukocytosis, patient afebrile rules out an infectious etiology    At this time patient will be admitted to the telemetry unit  Ongoing infectious evaluations to be obtained, blood cultures, sputum cultures  COPD exacerbation protocol, management of COPD  Evaluation for venous thrombi embolic disease, complaints of left leg  pain, recent travel, CTA PE and DVT duplex requested  Obtain echocardiogram to rule out cardiac concerns    At this time avoid empiric anti-infective therapy  At this time avoid IV fluid hydration until echocardiogram resulted, continue close clinical monitoring    Transaminitis- (present on admission)  Assessment & Plan  With hyperbilirubinemia    Creatinine highly consistent with alcoholic hepatitis with AST elevation, though patient in denial    Will obtain ultrasound right upper quadrant, hepatitis panel for further evaluation    We will check INR, monitor LFTs and INR    Elevated troponin- (present on admission)  Assessment & Plan  With elevated BNP  Chest pain protocol initiated, aspirin 325, cycle troponins, serial EKGs  Given use of aspirin, alcohol, prednisone, omeprazole/sucralfate for GI prophylaxis  Monitor on telemetry    EKG reviewed as discussed above    Differential diagnosis considered include: Acute pulmonary embolism, demand ischemia in the setting of respiratory failure/left ventricular hypertrophy, congestive heart failure, others    Continue to monitor troponins, monitor on telemetry, obtain echocardiogram for further evaluation  Evaluation for venous thromboembolic disease    Based on above, if diagnosis remains unclear, stress test/cardiology consultation may be considered    COPD with exacerbation (HCC)- (present on admission)  Assessment & Plan  Acute COPD exacerbation, COPD protocol initiated  Prednisone 50 ordered  Doxycycline 100 twice daily  Bronchodilator therapy, RT protocol  Counseled and educated regarding smoking cessation  Needs outpatient PFTs, pulmonology follow-up    Lactic acidosis- (present on admission)  Assessment & Plan  I believe this may be secondary to alcohol consumption, potentially contribution from underlying respiratory issues.  I do not see any evidence of infectious issues.  This does not reflect sepsis.    Patient will be initiated on sepsis protocol for  monitoring, this does not indicate presence of sepsis.    Hyperproteinemia- (present on admission)  Assessment & Plan  Incidental finding, check SPEP, UPEP    Hypokalemia- (present on admission)  Assessment & Plan  Replaced    Macrocytosis without anemia- (present on admission)  Assessment & Plan  With thrombocytopenia, suspect secondary to alcoholism  We will check B12, folate, TSH    History of heroin abuse- (present on admission)  Assessment & Plan  In remission, on Suboxone  Continue Suboxone therapy    Tobacco abuse- (present on admission)  Assessment & Plan  Counseled and educated regarding cessation    Alcohol dependence with unspecified alcohol-induced disorder (HCC)- (present on admission)  Assessment & Plan  Patient not honest regarding his underlying alcohol abuse  We will give x1 IV thiamine  CIWA protocol for monitoring of withdrawal, supplementation of benzodiazepines per CIWA protocol  Counseled and educated  Multivitamin support  Monitor electrolytes      VTE prophylaxis: SC Lovenox

## 2019-09-20 NOTE — ASSESSMENT & PLAN NOTE
With elevated BNP  Chest pain protocol initiated, aspirin 325, cycle troponins, serial EKGs  Given use of aspirin, alcohol, prednisone, omeprazole/sucralfate for GI prophylaxis  Monitor on telemetry    EKG reviewed as discussed above    Differential diagnosis considered include: Acute pulmonary embolism, demand ischemia in the setting of respiratory failure/left ventricular hypertrophy, congestive heart failure, others    Continue to monitor troponins, monitor on telemetry, obtain echocardiogram for further evaluation  Evaluation for venous thromboembolic disease    Based on above, if diagnosis remains unclear, stress test/cardiology consultation may be considered

## 2019-09-20 NOTE — ASSESSMENT & PLAN NOTE
Differential diagnosis considered by me include: This been secondary to underlying acute COPD exacerbation, acute venous thromboembolic disease, consideration for cardiac issues including consideration towards congestive heart failure.  Infectious etiologies appear less likely.    EKG obtained on presentation has been personally reviewed and interpreted by me, profound left ventricular hypertrophy otherwise no findings of acute ischemia or infarction on this EKG.  No findings concerning for right ventricular dysfunction.    Chest x-ray obtained on presentation has been personally reviewed and interpreted by me, emphysematous lungs without any other acute concerns based upon my evaluation.    Negative procalcitonin, no evidence of leukocytosis, patient afebrile rules out an infectious etiology    At this time patient will be admitted to the telemetry unit  Ongoing infectious evaluations to be obtained, blood cultures, sputum cultures  COPD exacerbation protocol, management of COPD  Evaluation for venous thrombi embolic disease, complaints of left leg pain, recent travel, CTA PE and DVT duplex requested  Obtain echocardiogram to rule out cardiac concerns    At this time avoid empiric anti-infective therapy  At this time avoid IV fluid hydration until echocardiogram resulted, continue close clinical monitoring

## 2019-09-20 NOTE — ASSESSMENT & PLAN NOTE
With hyperbilirubinemia    Creatinine highly consistent with alcoholic hepatitis with AST elevation, though patient in denial    Will obtain ultrasound right upper quadrant, hepatitis panel for further evaluation    We will check INR, monitor LFTs and INR

## 2019-09-20 NOTE — ASSESSMENT & PLAN NOTE
Acute COPD exacerbation, COPD protocol initiated  Prednisone 50 ordered  Doxycycline 100 twice daily  Bronchodilator therapy, RT protocol  Counseled and educated regarding smoking cessation  Needs outpatient PFTs, pulmonology follow-up

## 2019-09-20 NOTE — ED NOTES
"Chief Complaint   Patient presents with   • Shortness of Breath     Pt awoke at 0100 with SOB, has had productive cough with yellow sputum x 5D, \"last time I felt like this I had pneumonia\"       BIB EMS to R[facility], pt on monitor and in gown, call light in reach, chart up for ERP.     Medications given en route: none    BP (!) 186/115   Pulse 87   Temp 35.9 °C (96.7 °F) (Oral)   Resp 16   Ht 1.702 m (5' 7\")   Wt 65.3 kg (144 lb)   SpO2 95%   BMI 22.55 kg/m²   "
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
## 2019-09-20 NOTE — ASSESSMENT & PLAN NOTE
I believe this may be secondary to alcohol consumption, potentially contribution from underlying respiratory issues.  I do not see any evidence of infectious issues.  This does not reflect sepsis.    Patient will be initiated on sepsis protocol for monitoring, this does not indicate presence of sepsis.

## 2019-09-20 NOTE — ED PROVIDER NOTES
"ED Provider Note    CHIEF COMPLAINT  Chief Complaint   Patient presents with   • Shortness of Breath     Pt awoke at 0100 with SOB, has had productive cough with yellow sputum x 5D, \"last time I felt like this I had pneumonia\"       HPI  Al Doan is a 64 y.o. male who presents to the emergency department complaining of shortness of breath.  The patient states he woke up last night and melanotic complaining of shortness of breath.  Denies any chest pain.  He has had a cough productive of yellow sputum.  This is been present for the last several days.  Denies any history of PE or DVT.  Denies any chest discomfort no history of heart disease.  Denies any sore throat runny nose or cough.  Has a history of alcohol use, tobacco use.  Has an apparent history of COPD.  Also has a history of IV heroin abuse currently takes Suboxone.    Patient has a history of chronic hypertension has been off his medications for 2 weeks.    REVIEW OF SYSTEMS  See HPI for further details. All other systems are negative.    PAST MEDICAL HISTORY  Past Medical History:   Diagnosis Date   • Chronic obstructive pulmonary disease (HCC)    • ETOH abuse    • Hypertension        FAMILY HISTORY  History reviewed. No pertinent family history.    SOCIAL HISTORY  Social History     Socioeconomic History   • Marital status: Not on file     Spouse name: Not on file   • Number of children: Not on file   • Years of education: Not on file   • Highest education level: Not on file   Occupational History   • Not on file   Social Needs   • Financial resource strain: Not on file   • Food insecurity:     Worry: Not on file     Inability: Not on file   • Transportation needs:     Medical: Not on file     Non-medical: Not on file   Tobacco Use   • Smoking status: Current Every Day Smoker     Packs/day: 0.50     Types: Cigarettes   • Smokeless tobacco: Never Used   Substance and Sexual Activity   • Alcohol use: Yes     Alcohol/week: 2.4 oz     Types: 4 Cans of " "beer per week     Frequency: 4 or more times a week     Drinks per session: 3 or 4     Binge frequency: Weekly   • Drug use: Never   • Sexual activity: Not on file   Lifestyle   • Physical activity:     Days per week: Not on file     Minutes per session: Not on file   • Stress: Not on file   Relationships   • Social connections:     Talks on phone: Not on file     Gets together: Not on file     Attends Confucianism service: Not on file     Active member of club or organization: Not on file     Attends meetings of clubs or organizations: Not on file     Relationship status: Not on file   • Intimate partner violence:     Fear of current or ex partner: Not on file     Emotionally abused: Not on file     Physically abused: Not on file     Forced sexual activity: Not on file   Other Topics Concern   • Not on file   Social History Narrative   • Not on file       SURGICAL HISTORY  History reviewed. No pertinent surgical history.    CURRENT MEDICATIONS  Home Medications    **Home medications have not yet been reviewed for this encounter**         ALLERGIES  No Known Allergies    PHYSICAL EXAM  VITAL SIGNS: BP (!) 186/115   Pulse 87   Temp 35.9 °C (96.7 °F) (Oral)   Resp 16   Ht 1.702 m (5' 7\")   Wt 65.3 kg (144 lb)   SpO2 95%   BMI 22.55 kg/m²    Constitutional: Awake, alert, no acute court-appointed distress.  HENT: Normocephalic, Atraumatic, Bilateral external ears normal, Oropharynx moist, No oral exudates, Nose normal.   Eyes: PERRL, EOMI, Conjunctiva normal, No discharge.   Neck: Normal range of motion, No tenderness, Supple, No stridor.   Cardiovascular: Normal heart rate, Normal rhythm, No murmurs, No rubs, No gallops.   Thorax & Lungs: Normal breath sounds, No respiratory distress, No wheezing, No chest tenderness.   Abdomen: Bowel sounds normal, Soft, No tenderness  Skin: Warm, Dry, No erythema, No rash.   Back: No tenderness, No CVA tenderness.   Musculoskeletal: Good range of motion in all major joints.  Good " pulses no asymmetric edema  Neurologic: Alert,  No focal deficits noted.   Psychiatric: Affect normal      Results for orders placed or performed during the hospital encounter of 09/20/19   LACTIC ACID   Result Value Ref Range    Lactic Acid 2.5 (H) 0.5 - 2.0 mmol/L   LACTIC ACID   Result Value Ref Range    Lactic Acid 2.5 (H) 0.5 - 2.0 mmol/L   CBC WITH DIFFERENTIAL   Result Value Ref Range    WBC 5.7 4.8 - 10.8 K/uL    RBC 3.90 (L) 4.70 - 6.10 M/uL    Hemoglobin 13.4 (L) 14.0 - 18.0 g/dL    Hematocrit 39.4 (L) 42.0 - 52.0 %    .0 (H) 81.4 - 97.8 fL    MCH 34.4 (H) 27.0 - 33.0 pg    MCHC 34.0 33.7 - 35.3 g/dL    RDW 50.9 (H) 35.9 - 50.0 fL    Platelet Count 152 (L) 164 - 446 K/uL    MPV 11.1 9.0 - 12.9 fL    Neutrophils-Polys 53.30 44.00 - 72.00 %    Lymphocytes 34.10 22.00 - 41.00 %    Monocytes 9.80 0.00 - 13.40 %    Eosinophils 1.40 0.00 - 6.90 %    Basophils 1.20 0.00 - 1.80 %    Immature Granulocytes 0.20 0.00 - 0.90 %    Nucleated RBC 0.00 /100 WBC    Neutrophils (Absolute) 3.05 1.82 - 7.42 K/uL    Lymphs (Absolute) 1.95 1.00 - 4.80 K/uL    Monos (Absolute) 0.56 0.00 - 0.85 K/uL    Eos (Absolute) 0.08 0.00 - 0.51 K/uL    Baso (Absolute) 0.07 0.00 - 0.12 K/uL    Immature Granulocytes (abs) 0.01 0.00 - 0.11 K/uL    NRBC (Absolute) 0.00 K/uL   COMP METABOLIC PANEL   Result Value Ref Range    Sodium 135 135 - 145 mmol/L    Potassium 3.1 (L) 3.6 - 5.5 mmol/L    Chloride 96 96 - 112 mmol/L    Co2 25 20 - 33 mmol/L    Anion Gap 14.0 (H) 0.0 - 11.9    Glucose 186 (H) 65 - 99 mg/dL    Bun 10 8 - 22 mg/dL    Creatinine 0.83 0.50 - 1.40 mg/dL    Calcium 8.9 8.5 - 10.5 mg/dL    AST(SGOT) 114 (H) 12 - 45 U/L    ALT(SGPT) 30 2 - 50 U/L    Alkaline Phosphatase 161 (H) 30 - 99 U/L    Total Bilirubin 1.8 (H) 0.1 - 1.5 mg/dL    Albumin 3.9 3.2 - 4.9 g/dL    Total Protein 8.3 (H) 6.0 - 8.2 g/dL    Globulin 4.4 (H) 1.9 - 3.5 g/dL    A-G Ratio 0.9 g/dL   proBrain Natriuretic Peptide, NT   Result Value Ref Range     NT-proBNP 2416 (H) 0 - 125 pg/mL   TROPONIN   Result Value Ref Range    Troponin T 43 (H) 6 - 19 ng/L   ESTIMATED GFR   Result Value Ref Range    GFR If African American >60 >60 mL/min/1.73 m 2    GFR If Non African American >60 >60 mL/min/1.73 m 2   PROCALCITONIN   Result Value Ref Range    Procalcitonin <0.05 <0.25 ng/mL   URINALYSIS,CULTURE IF INDICATED   Result Value Ref Range    Color Yellow     Character Clear     Specific Gravity 1.009 <1.035    Ph 7.5 5.0 - 8.0    Glucose 100 (A) Negative mg/dL    Ketones Negative Negative mg/dL    Protein Negative Negative mg/dL    Bilirubin Negative Negative    Urobilinogen, Urine 1.0 Negative    Nitrite Negative Negative    Leukocyte Esterase Negative Negative    Occult Blood Trace (A) Negative    Micro Urine Req Microscopic    URINE DRUG SCREEN   Result Value Ref Range    Amphetamines Urine Negative Negative    Barbiturates Negative Negative    Benzodiazepines Negative Negative    Cocaine Metabolite Negative Negative    Methadone Negative Negative    Opiates Negative Negative    Oxycodone Negative Negative    Phencyclidine -Pcp Negative Negative    Propoxyphene Negative Negative    Cannabinoid Metab Positive (A) Negative   URINE MICROSCOPIC (W/UA)   Result Value Ref Range    WBC 0-2 (A) /hpf    RBC 2-5 (A) /hpf    Bacteria Negative None /hpf    Epithelial Cells Negative /hpf    Hyaline Cast 0-2 /lpf   EKG   Result Value Ref Range    Report       [facility] Emergency Dept.    Test Date:  2019  Pt Name:    PAULINA MENDOZA               Department: ER  MRN:        [id number]                      Room:        02  Gender:     Male                         Technician: [id number]  :        1955                   Requested By:RALF ESPARZA  Order #:    [id number]                    Roderick MD: RALF ESPARZA. AMD    Measurements  Intervals                                Axis  Rate:       86                           P:          -69  SC:         121                           QRS:        36  QRSD:       106                          T:          88  QT:         442  QTc:        529    Interpretive Statements  \Normal sinus rhythm  LVH with secondary repolarization abnormality  Prolonged QT interval  No previous ECG available for comparison    Electronically Signed On 9- 7:46:15 PDT by RALF ESPARZA. [facility]          RADIOLOGY/PROCEDURES  DX-CHEST-PORTABLE (1 VIEW)   Final Result         1.  No acute cardiopulmonary disease.   2.  Atherosclerosis   3.  Hyperexpansion of lungs favors changes of COPD.            COURSE & MEDICAL DECISION MAKING  Pertinent Labs & Imaging studies reviewed. (See chart for details)    Patient presents with cough, shortness of breath for last several days of duration.    He has a history of hypertension is not taking his medications.  Also has a history of COPD.    Differential diagnosis considered includes but is not limited to COPD exacerbation, pneumonia, CHF, less likely PE.    The patient's cardiogram shows LVH which is likely secondary to chronic untreated hypertension.  Is a slightly elevated troponin.  I think this is likely related to hypertension and CHF I do not think he is having cardiac ischemia.  He also has an elevated BNP.      The patient's chest x-ray does not show an infiltrate.  Does not have a leukocytosis.  He does have a history of COPD.  He is given prednisone and breathing treatment.  He is reassessed and after time he is improved.      At this point the patient be admitted to Dr. León.  He remains hypoxemic on room air.  Like there is a component of heart failure with elevated BNP and has a component of COPD exacerbation as well.  He is admitted to the hospitalist in guarded condition for continued work-up and treatment      FINAL IMPRESSION  1. Acute exacerbation of chronic obstructive pulmonary disease (COPD) (HCC)     2. Acute congestive heart failure, unspecified heart failure type (HCC)     3. Acute  respiratory failure with hypoxia (HCC)     4. Essential hypertension     5. Hypokalemia         2.   3.         Electronically signed by: Karsten Marie, 9/20/2019 6:56 AM

## 2019-09-21 ENCOUNTER — APPOINTMENT (OUTPATIENT)
Dept: CARDIOLOGY | Facility: MEDICAL CENTER | Age: 64
DRG: 190 | End: 2019-09-21
Attending: INTERNAL MEDICINE
Payer: MEDICARE

## 2019-09-21 PROBLEM — Z59.00 HOMELESSNESS: Status: ACTIVE | Noted: 2019-09-21

## 2019-09-21 PROBLEM — B18.2 CHRONIC HEPATITIS C WITHOUT HEPATIC COMA (HCC): Status: ACTIVE | Noted: 2019-09-21

## 2019-09-21 LAB
ALBUMIN SERPL BCP-MCNC: 3.2 G/DL (ref 3.2–4.9)
ALBUMIN/GLOB SERPL: 0.8 G/DL
ALP SERPL-CCNC: 155 U/L (ref 30–99)
ALT SERPL-CCNC: 29 U/L (ref 2–50)
ANION GAP SERPL CALC-SCNC: 6 MMOL/L (ref 0–11.9)
AST SERPL-CCNC: 107 U/L (ref 12–45)
BASOPHILS # BLD AUTO: 0.2 % (ref 0–1.8)
BASOPHILS # BLD: 0.02 K/UL (ref 0–0.12)
BILIRUB SERPL-MCNC: 2 MG/DL (ref 0.1–1.5)
BUN SERPL-MCNC: 28 MG/DL (ref 8–22)
CALCIUM SERPL-MCNC: 8.8 MG/DL (ref 8.5–10.5)
CHLORIDE SERPL-SCNC: 93 MMOL/L (ref 96–112)
CO2 SERPL-SCNC: 28 MMOL/L (ref 20–33)
CREAT SERPL-MCNC: 1.28 MG/DL (ref 0.5–1.4)
EOSINOPHIL # BLD AUTO: 0 K/UL (ref 0–0.51)
EOSINOPHIL NFR BLD: 0 % (ref 0–6.9)
ERYTHROCYTE [DISTWIDTH] IN BLOOD BY AUTOMATED COUNT: 51.7 FL (ref 35.9–50)
FOLATE SERPL-MCNC: 20.9 NG/ML
GLOBULIN SER CALC-MCNC: 3.8 G/DL (ref 1.9–3.5)
GLUCOSE SERPL-MCNC: 257 MG/DL (ref 65–99)
HCT VFR BLD AUTO: 35 % (ref 42–52)
HGB BLD-MCNC: 12 G/DL (ref 14–18)
IMM GRANULOCYTES # BLD AUTO: 0.05 K/UL (ref 0–0.11)
IMM GRANULOCYTES NFR BLD AUTO: 0.4 % (ref 0–0.9)
INR PPP: 1.29 (ref 0.87–1.13)
LV EJECT FRACT  99904: 60
LV EJECT FRACT MOD 2C 99903: 62.45
LV EJECT FRACT MOD 4C 99902: 56.44
LV EJECT FRACT MOD BP 99901: 59.77
LYMPHOCYTES # BLD AUTO: 0.88 K/UL (ref 1–4.8)
LYMPHOCYTES NFR BLD: 7.3 % (ref 22–41)
MAGNESIUM SERPL-MCNC: 2.6 MG/DL (ref 1.5–2.5)
MCH RBC QN AUTO: 35.3 PG (ref 27–33)
MCHC RBC AUTO-ENTMCNC: 34.3 G/DL (ref 33.7–35.3)
MCV RBC AUTO: 102.9 FL (ref 81.4–97.8)
MONOCYTES # BLD AUTO: 0.81 K/UL (ref 0–0.85)
MONOCYTES NFR BLD AUTO: 6.8 % (ref 0–13.4)
NEUTROPHILS # BLD AUTO: 10.22 K/UL (ref 1.82–7.42)
NEUTROPHILS NFR BLD: 85.3 % (ref 44–72)
NRBC # BLD AUTO: 0 K/UL
NRBC BLD-RTO: 0 /100 WBC
PHOSPHATE SERPL-MCNC: 2.7 MG/DL (ref 2.5–4.5)
PLATELET # BLD AUTO: 130 K/UL (ref 164–446)
PMV BLD AUTO: 11.7 FL (ref 9–12.9)
POTASSIUM SERPL-SCNC: 4.2 MMOL/L (ref 3.6–5.5)
PROT SERPL-MCNC: 7 G/DL (ref 6–8.2)
PROTHROMBIN TIME: 16.5 SEC (ref 12–14.6)
RBC # BLD AUTO: 3.4 M/UL (ref 4.7–6.1)
SODIUM SERPL-SCNC: 127 MMOL/L (ref 135–145)
TSH SERPL DL<=0.005 MIU/L-ACNC: 0.57 UIU/ML (ref 0.38–5.33)
VIT B12 SERPL-MCNC: 441 PG/ML (ref 211–911)
WBC # BLD AUTO: 12 K/UL (ref 4.8–10.8)

## 2019-09-21 PROCEDURE — 700101 HCHG RX REV CODE 250: Performed by: HOSPITALIST

## 2019-09-21 PROCEDURE — 82784 ASSAY IGA/IGD/IGG/IGM EACH: CPT

## 2019-09-21 PROCEDURE — 96376 TX/PRO/DX INJ SAME DRUG ADON: CPT

## 2019-09-21 PROCEDURE — A9270 NON-COVERED ITEM OR SERVICE: HCPCS | Performed by: INTERNAL MEDICINE

## 2019-09-21 PROCEDURE — 94640 AIRWAY INHALATION TREATMENT: CPT

## 2019-09-21 PROCEDURE — 700101 HCHG RX REV CODE 250: Performed by: INTERNAL MEDICINE

## 2019-09-21 PROCEDURE — 83735 ASSAY OF MAGNESIUM: CPT

## 2019-09-21 PROCEDURE — 94760 N-INVAS EAR/PLS OXIMETRY 1: CPT

## 2019-09-21 PROCEDURE — 84165 PROTEIN E-PHORESIS SERUM: CPT

## 2019-09-21 PROCEDURE — 83883 ASSAY NEPHELOMETRY NOT SPEC: CPT

## 2019-09-21 PROCEDURE — 700111 HCHG RX REV CODE 636 W/ 250 OVERRIDE (IP): Performed by: HOSPITALIST

## 2019-09-21 PROCEDURE — 85025 COMPLETE CBC W/AUTO DIFF WBC: CPT

## 2019-09-21 PROCEDURE — 93306 TTE W/DOPPLER COMPLETE: CPT | Mod: 26 | Performed by: INTERNAL MEDICINE

## 2019-09-21 PROCEDURE — 86334 IMMUNOFIX E-PHORESIS SERUM: CPT

## 2019-09-21 PROCEDURE — 82746 ASSAY OF FOLIC ACID SERUM: CPT

## 2019-09-21 PROCEDURE — 3E02340 INTRODUCTION OF INFLUENZA VACCINE INTO MUSCLE, PERCUTANEOUS APPROACH: ICD-10-PCS | Performed by: HOSPITALIST

## 2019-09-21 PROCEDURE — 84155 ASSAY OF PROTEIN SERUM: CPT

## 2019-09-21 PROCEDURE — 700102 HCHG RX REV CODE 250 W/ 637 OVERRIDE(OP): Performed by: INTERNAL MEDICINE

## 2019-09-21 PROCEDURE — 90662 IIV NO PRSV INCREASED AG IM: CPT | Performed by: HOSPITALIST

## 2019-09-21 PROCEDURE — 90471 IMMUNIZATION ADMIN: CPT

## 2019-09-21 PROCEDURE — 85610 PROTHROMBIN TIME: CPT

## 2019-09-21 PROCEDURE — 94668 MNPJ CHEST WALL SBSQ: CPT

## 2019-09-21 PROCEDURE — 770020 HCHG ROOM/CARE - TELE (206)

## 2019-09-21 PROCEDURE — 36415 COLL VENOUS BLD VENIPUNCTURE: CPT

## 2019-09-21 PROCEDURE — 84100 ASSAY OF PHOSPHORUS: CPT

## 2019-09-21 PROCEDURE — 80053 COMPREHEN METABOLIC PANEL: CPT

## 2019-09-21 PROCEDURE — 99232 SBSQ HOSP IP/OBS MODERATE 35: CPT | Performed by: HOSPITALIST

## 2019-09-21 PROCEDURE — 84443 ASSAY THYROID STIM HORMONE: CPT

## 2019-09-21 PROCEDURE — 700111 HCHG RX REV CODE 636 W/ 250 OVERRIDE (IP): Performed by: INTERNAL MEDICINE

## 2019-09-21 PROCEDURE — 82607 VITAMIN B-12: CPT

## 2019-09-21 PROCEDURE — 84156 ASSAY OF PROTEIN URINE: CPT

## 2019-09-21 PROCEDURE — 93306 TTE W/DOPPLER COMPLETE: CPT

## 2019-09-21 PROCEDURE — 96372 THER/PROPH/DIAG INJ SC/IM: CPT

## 2019-09-21 RX ORDER — IPRATROPIUM BROMIDE AND ALBUTEROL SULFATE 2.5; .5 MG/3ML; MG/3ML
3 SOLUTION RESPIRATORY (INHALATION)
Status: DISCONTINUED | OUTPATIENT
Start: 2019-09-21 | End: 2019-09-22 | Stop reason: HOSPADM

## 2019-09-21 RX ADMIN — SUCRALFATE 1 G: 1 SUSPENSION ORAL at 17:55

## 2019-09-21 RX ADMIN — LORAZEPAM 1 MG: 1 TABLET ORAL at 17:55

## 2019-09-21 RX ADMIN — GABAPENTIN 600 MG: 300 CAPSULE ORAL at 11:45

## 2019-09-21 RX ADMIN — DOXYCYCLINE 100 MG: 100 TABLET, FILM COATED ORAL at 06:39

## 2019-09-21 RX ADMIN — IPRATROPIUM BROMIDE AND ALBUTEROL SULFATE 3 ML: .5; 3 SOLUTION RESPIRATORY (INHALATION) at 20:10

## 2019-09-21 RX ADMIN — OMEPRAZOLE 20 MG: 20 CAPSULE, DELAYED RELEASE ORAL at 06:39

## 2019-09-21 RX ADMIN — NICOTINE 14 MG: 14 PATCH TRANSDERMAL at 06:40

## 2019-09-21 RX ADMIN — THERA TABS 1 TABLET: TAB at 06:39

## 2019-09-21 RX ADMIN — IPRATROPIUM BROMIDE AND ALBUTEROL SULFATE 3 ML: .5; 3 SOLUTION RESPIRATORY (INHALATION) at 07:15

## 2019-09-21 RX ADMIN — CLONIDINE HYDROCHLORIDE 0.3 MG: 0.1 TABLET ORAL at 17:55

## 2019-09-21 RX ADMIN — PREDNISONE 50 MG: 50 TABLET ORAL at 06:39

## 2019-09-21 RX ADMIN — LORAZEPAM 0.5 MG: 1 TABLET ORAL at 10:06

## 2019-09-21 RX ADMIN — DOXYCYCLINE 100 MG: 100 TABLET, FILM COATED ORAL at 17:55

## 2019-09-21 RX ADMIN — IPRATROPIUM BROMIDE AND ALBUTEROL SULFATE 3 ML: .5; 3 SOLUTION RESPIRATORY (INHALATION) at 10:24

## 2019-09-21 RX ADMIN — Medication 100 MG: at 06:38

## 2019-09-21 RX ADMIN — CLONIDINE HYDROCHLORIDE 0.3 MG: 0.1 TABLET ORAL at 06:39

## 2019-09-21 RX ADMIN — GABAPENTIN 600 MG: 300 CAPSULE ORAL at 17:55

## 2019-09-21 RX ADMIN — ENOXAPARIN SODIUM 40 MG: 100 INJECTION SUBCUTANEOUS at 06:40

## 2019-09-21 RX ADMIN — FOLIC ACID 1 MG: 1 TABLET ORAL at 06:39

## 2019-09-21 RX ADMIN — BUPRENORPHINE HYDROCHLORIDE AND NALOXONE HYDROCHLORIDE DIHYDRATE 1 TABLET: 8; 2 TABLET SUBLINGUAL at 17:56

## 2019-09-21 RX ADMIN — GABAPENTIN 600 MG: 300 CAPSULE ORAL at 06:40

## 2019-09-21 RX ADMIN — LORAZEPAM 1 MG: 1 TABLET ORAL at 14:12

## 2019-09-21 RX ADMIN — SUCRALFATE 1 G: 1 SUSPENSION ORAL at 11:45

## 2019-09-21 RX ADMIN — LISINOPRIL 10 MG: 5 TABLET ORAL at 06:39

## 2019-09-21 RX ADMIN — INFLUENZA A VIRUS A/MICHIGAN/45/2015 X-275 (H1N1) ANTIGEN (FORMALDEHYDE INACTIVATED), INFLUENZA A VIRUS A/SINGAPORE/INFIMH-16-0019/2016 IVR-186 (H3N2) ANTIGEN (FORMALDEHYDE INACTIVATED), AND INFLUENZA B VIRUS B/MARYLAND/15/2016 BX-69A (A B/COLORADO/6/2017-LIKE VIRUS) ANTIGEN (FORMALDEHYDE INACTIVATED) 0.5 ML: 60; 60; 60 INJECTION, SUSPENSION INTRAMUSCULAR at 11:45

## 2019-09-21 RX ADMIN — LORAZEPAM 1 MG: 1 TABLET ORAL at 06:38

## 2019-09-21 RX ADMIN — LORAZEPAM 0.5 MG: 2 INJECTION INTRAMUSCULAR; INTRAVENOUS at 21:28

## 2019-09-21 RX ADMIN — BUPRENORPHINE HYDROCHLORIDE AND NALOXONE HYDROCHLORIDE DIHYDRATE 1 TABLET: 8; 2 TABLET SUBLINGUAL at 06:39

## 2019-09-21 RX ADMIN — ASPIRIN 325 MG: 325 TABLET, FILM COATED ORAL at 06:39

## 2019-09-21 RX ADMIN — OMEPRAZOLE 20 MG: 20 CAPSULE, DELAYED RELEASE ORAL at 17:57

## 2019-09-21 RX ADMIN — SUCRALFATE 1 G: 1 SUSPENSION ORAL at 06:40

## 2019-09-21 ASSESSMENT — COGNITIVE AND FUNCTIONAL STATUS - GENERAL
CLIMB 3 TO 5 STEPS WITH RAILING: A LITTLE
DAILY ACTIVITIY SCORE: 22
SUGGESTED CMS G CODE MODIFIER DAILY ACTIVITY: CJ
DRESSING REGULAR UPPER BODY CLOTHING: A LITTLE
MOBILITY SCORE: 18
TURNING FROM BACK TO SIDE WHILE IN FLAT BAD: A LITTLE
WALKING IN HOSPITAL ROOM: A LITTLE
STANDING UP FROM CHAIR USING ARMS: A LITTLE
MOVING FROM LYING ON BACK TO SITTING ON SIDE OF FLAT BED: A LITTLE
MOVING TO AND FROM BED TO CHAIR: A LITTLE
SUGGESTED CMS G CODE MODIFIER MOBILITY: CK
DRESSING REGULAR LOWER BODY CLOTHING: A LITTLE

## 2019-09-21 ASSESSMENT — LIFESTYLE VARIABLES
TOTAL SCORE: 10
PAROXYSMAL SWEATS: BARELY PERCEPTIBLE SWEATING. PALMS MOIST
TREMOR: *
NAUSEA AND VOMITING: NO NAUSEA AND NO VOMITING
AUDITORY DISTURBANCES: NOT PRESENT
AUDITORY DISTURBANCES: NOT PRESENT
TREMOR: TREMOR NOT VISIBLE BUT CAN BE FELT, FINGERTIP TO FINGERTIP
NAUSEA AND VOMITING: NO NAUSEA AND NO VOMITING
VISUAL DISTURBANCES: MODERATE SENSITIVITY
AGITATION: NORMAL ACTIVITY
HEADACHE, FULLNESS IN HEAD: VERY MILD
NAUSEA AND VOMITING: NO NAUSEA AND NO VOMITING
VISUAL DISTURBANCES: MODERATE SENSITIVITY
PAROXYSMAL SWEATS: *
AGITATION: NORMAL ACTIVITY
TOTAL SCORE: 2
TREMOR: *
AUDITORY DISTURBANCES: NOT PRESENT
ORIENTATION AND CLOUDING OF SENSORIUM: ORIENTED AND CAN DO SERIAL ADDITIONS
TOTAL SCORE: 6
AGITATION: NORMAL ACTIVITY
PAROXYSMAL SWEATS: NO SWEAT VISIBLE
VISUAL DISTURBANCES: NOT PRESENT
TOTAL SCORE: 8
NAUSEA AND VOMITING: NO NAUSEA AND NO VOMITING
TOTAL SCORE: VERY MILD ITCHING, PINS AND NEEDLES SENSATION, BURNING OR NUMBNESS
AUDITORY DISTURBANCES: NOT PRESENT
TREMOR: *
ORIENTATION AND CLOUDING OF SENSORIUM: ORIENTED AND CAN DO SERIAL ADDITIONS
ANXIETY: MILDLY ANXIOUS
NAUSEA AND VOMITING: NO NAUSEA AND NO VOMITING
TOTAL SCORE: 10
AUDITORY DISTURBANCES: NOT PRESENT
AUDITORY DISTURBANCES: NOT PRESENT
ANXIETY: *
TOTAL SCORE: VERY MILD ITCHING, PINS AND NEEDLES SENSATION, BURNING OR NUMBNESS
TREMOR: *
ORIENTATION AND CLOUDING OF SENSORIUM: ORIENTED AND CAN DO SERIAL ADDITIONS
NAUSEA AND VOMITING: NO NAUSEA AND NO VOMITING
PAROXYSMAL SWEATS: BARELY PERCEPTIBLE SWEATING. PALMS MOIST
HEADACHE, FULLNESS IN HEAD: NOT PRESENT
ORIENTATION AND CLOUDING OF SENSORIUM: ORIENTED AND CAN DO SERIAL ADDITIONS
ANXIETY: *
NAUSEA AND VOMITING: NO NAUSEA AND NO VOMITING
ORIENTATION AND CLOUDING OF SENSORIUM: ORIENTED AND CAN DO SERIAL ADDITIONS
PAROXYSMAL SWEATS: NO SWEAT VISIBLE
HEADACHE, FULLNESS IN HEAD: NOT PRESENT
AUDITORY DISTURBANCES: NOT PRESENT
TREMOR: TREMOR NOT VISIBLE BUT CAN BE FELT, FINGERTIP TO FINGERTIP
PAROXYSMAL SWEATS: BARELY PERCEPTIBLE SWEATING. PALMS MOIST
AGITATION: NORMAL ACTIVITY
VISUAL DISTURBANCES: NOT PRESENT
AGITATION: NORMAL ACTIVITY
ANXIETY: *
TOTAL SCORE: 8
HEADACHE, FULLNESS IN HEAD: NOT PRESENT
TOTAL SCORE: 2
AGITATION: NORMAL ACTIVITY
ORIENTATION AND CLOUDING OF SENSORIUM: ORIENTED AND CAN DO SERIAL ADDITIONS
ORIENTATION AND CLOUDING OF SENSORIUM: ORIENTED AND CAN DO SERIAL ADDITIONS
VISUAL DISTURBANCES: MILD SENSITIVITY
ANXIETY: MILDLY ANXIOUS
VISUAL DISTURBANCES: NOT PRESENT
SUBSTANCE_ABUSE: 0
TOTAL SCORE: VERY MILD ITCHING, PINS AND NEEDLES SENSATION, BURNING OR NUMBNESS
AGITATION: *
HEADACHE, FULLNESS IN HEAD: NOT PRESENT
VISUAL DISTURBANCES: NOT PRESENT
PAROXYSMAL SWEATS: BARELY PERCEPTIBLE SWEATING. PALMS MOIST
ANXIETY: *
ANXIETY: MILDLY ANXIOUS
HEADACHE, FULLNESS IN HEAD: NOT PRESENT
HEADACHE, FULLNESS IN HEAD: NOT PRESENT
TOTAL SCORE: VERY MILD ITCHING, PINS AND NEEDLES SENSATION, BURNING OR NUMBNESS
TREMOR: *

## 2019-09-21 ASSESSMENT — ENCOUNTER SYMPTOMS
VOMITING: 0
NECK PAIN: 0
LOSS OF CONSCIOUSNESS: 0
HEADACHES: 0
SORE THROAT: 0
SENSORY CHANGE: 0
HEMOPTYSIS: 0
CONSTIPATION: 0
NAUSEA: 0
EYE DISCHARGE: 0
PALPITATIONS: 0
BRUISES/BLEEDS EASILY: 0
FOCAL WEAKNESS: 0
DEPRESSION: 0
SPEECH CHANGE: 0
SPUTUM PRODUCTION: 0
CLAUDICATION: 0
FEVER: 0
SHORTNESS OF BREATH: 1
DIAPHORESIS: 0
EYE PAIN: 0
WEAKNESS: 0
CHILLS: 0
WHEEZING: 0
COUGH: 0
DIARRHEA: 0
MYALGIAS: 0
ABDOMINAL PAIN: 0
DIZZINESS: 0
BACK PAIN: 0

## 2019-09-21 ASSESSMENT — PATIENT HEALTH QUESTIONNAIRE - PHQ9
2. FEELING DOWN, DEPRESSED, IRRITABLE, OR HOPELESS: NOT AT ALL
1. LITTLE INTEREST OR PLEASURE IN DOING THINGS: NOT AT ALL
SUM OF ALL RESPONSES TO PHQ9 QUESTIONS 1 AND 2: 0

## 2019-09-21 NOTE — PROGRESS NOTES
Pt stable this morning. Still requiring 1L NC for oxygen to stay at 90 or above. Denies SOB at this time. Will continue to monitor.

## 2019-09-21 NOTE — HEART FAILURE PROGRAM
Cardiovascular Nurse Navigator () Advanced Heart Failure Program Inpatient Progress Note:     Chief Complaint per : Acute hypoxemic respiratory failure (HCC)- (present on admission)  Assessment & Plan  Differential diagnosis considered: This has been secondary to underlying acute COPD exacerbation, acute venous thromboembolic disease, consideration for cardiac issues including consideration towards congestive heart failure.  Infectious etiologies appear less likely.      Please note that patient was diagnosed with HF by ED provider.    Echo pending completion.      If any education is provided to patient and family, please specify that heart failure is a working diagnosis until MD has confirmed diagnosis and has discussed it with the patient.     If after echocardiogram results are available, heart failure is ruled out, respectfully request that:    1. Attending provider clearly state in note that HF is ruled out. If this is not done, patient will still code for heart failure.    2. Remove HF from the problems list so that staff are not confused about necessary interventions and patient does not receive education on a diagnosis he does not have.    If echocardiogram results do not eliminate HF as a diagnosis, please consult cardiology as is expected for all new HF diagnoses.     Thank you and please call with questions, Joyce Ambriz and  Carmel Navarro

## 2019-09-21 NOTE — PROGRESS NOTES
Hospital Medicine Daily Progress Note    Date of Service  9/21/2019    Chief Complaint  64 y.o. male admitted 9/20/2019 with SOB.    Hospital Course    9/21:   This 64-year-old male revealed no med who has moved from several areas including [place] in [facility] recently arrived by [employer] to the homeless shelter in [place], hepatitis C positive , he is on chronic Suboxone therapy for IV heroin abuse, known COPD but not currently on home oxygen, recent admission for recurrent pneumonia per patient presents to the emergency room with productive yellow sputum and shortness of breath forward smoker and alcoholic.  Chest x-ray with COPD, CTA chest negative, ultrasound right upper quadrant with fatty infiltration of the liver, echo pending ultrasound lower extremities pending.  BNP elevated 2416.  Patient denies chest pain.  Troponin 43.  He is normal sinus rhythm on the monitor.  Currently on 1 L oxygen satting 95% on room air.  He was started on prednisone 50 mg daily as well as doxycycline on admission.*      Consultants/Specialty  none    Code Status  full    Disposition  Homeless, came on [employer] to shelter overflow from [place].  suboxone therapy for heroin addiction.  Needs to wean off O2, echo pending    Review of Systems  Review of Systems   Constitutional: Negative for chills, diaphoresis, fever and malaise/fatigue.   HENT: Negative for congestion and sore throat.    Eyes: Negative for pain and discharge.   Respiratory: Positive for shortness of breath. Negative for cough, hemoptysis, sputum production and wheezing.    Cardiovascular: Negative for chest pain, palpitations, claudication and leg swelling.   Gastrointestinal: Negative for abdominal pain, constipation, diarrhea, melena, nausea and vomiting.   Genitourinary: Negative for dysuria, frequency and urgency.   Musculoskeletal: Negative for back pain, joint pain, myalgias and neck pain.   Skin: Negative for itching and rash.   Neurological: Negative for  dizziness, sensory change, speech change, focal weakness, loss of consciousness, weakness and headaches.   Endo/Heme/Allergies: Does not bruise/bleed easily.   Psychiatric/Behavioral: Negative for depression, substance abuse and suicidal ideas.        Physical Exam  Temp:  [36.6 °C (97.8 °F)-36.8 °C (98.2 °F)] 36.7 °C (98 °F)  Pulse:  [68-92] 70  Resp:  [14-18] 16  BP: ()/(55-73) 108/59  SpO2:  [90 %-99 %] 94 %    Physical Exam   Constitutional: He is oriented to person, place, and time. He appears well-developed and well-nourished. No distress.   2 different attempts to examine, interview patient and patient sleeping comfortably supine on NC O2.   HENT:   Head: Normocephalic and atraumatic.   Mouth/Throat: Oropharynx is clear and moist. No oropharyngeal exudate.   Eyes: Pupils are equal, round, and reactive to light. Conjunctivae and EOM are normal. Right eye exhibits no discharge. Left eye exhibits no discharge. No scleral icterus.   Neck: Normal range of motion. Neck supple. No JVD present. No tracheal deviation present. No thyromegaly present.   Cardiovascular: Normal rate, regular rhythm and normal heart sounds. Exam reveals no gallop and no friction rub.   No murmur heard.  Pulmonary/Chest: Effort normal and breath sounds normal. No respiratory distress. He has no wheezes. He has no rales. He exhibits no tenderness.   Abdominal: Soft. Bowel sounds are normal. He exhibits no distension and no mass. There is no tenderness. There is no rebound and no guarding.   Musculoskeletal: Normal range of motion. He exhibits no edema or tenderness.   Lymphadenopathy:     He has no cervical adenopathy.   Neurological: He is alert and oriented to person, place, and time. No cranial nerve deficit. He exhibits normal muscle tone.   Skin: Skin is warm and dry. No rash noted. He is not diaphoretic. No erythema.   Psychiatric: He has a normal mood and affect. His behavior is normal. Judgment and thought content normal.    Nursing note and vitals reviewed.      Fluids    Intake/Output Summary (Last 24 hours) at 9/21/2019 1855  Last data filed at 9/21/2019 1244  Gross per 24 hour   Intake 536 ml   Output --   Net 536 ml       Laboratory  Recent Labs     09/20/19  0659 09/21/19 0224   WBC 5.7 12.0*   RBC 3.90* 3.40*   HEMOGLOBIN 13.4* 12.0*   HEMATOCRIT 39.4* 35.0*   .0* 102.9*   MCH 34.4* 35.3*   MCHC 34.0 34.3   RDW 50.9* 51.7*   PLATELETCT 152* 130*   MPV 11.1 11.7     Recent Labs     09/20/19  0659 09/21/19 0224   SODIUM 135 127*   POTASSIUM 3.1* 4.2   CHLORIDE 96 93*   CO2 25 28   GLUCOSE 186* 257*   BUN 10 28*   CREATININE 0.83 1.28   CALCIUM 8.9 8.8     Recent Labs     09/20/19 0659 09/21/19 0224   APTT 31.9  --    INR 1.26* 1.29*               Imaging  EC-ECHOCARDIOGRAM COMPLETE W/O CONT   Final Result      US-RUQ   Final Result      1.  Increased hepatic echotexture is consistent with fatty infiltration.      CT-CTA CHEST PULMONARY ARTERY W/ RECONS   Final Result      1.  No pulmonary embolus is identified.      2.  Ascending aortic aneurysm with a maximum diameter 4.1 cm.      3.  Atherosclerosis.      4.  Probable hepatic steatosis            US-EXTREMITY VENOUS LOWER BILAT   Final Result      DX-CHEST-PORTABLE (1 VIEW)   Final Result         1.  No acute cardiopulmonary disease.   2.  Atherosclerosis   3.  Hyperexpansion of lungs favors changes of COPD.           Assessment/Plan  Acute hypoxemic respiratory failure (HCC)- (present on admission)  Assessment & Plan  Differential diagnosis considered by me include: This been secondary to underlying acute COPD exacerbation, acute venous thromboembolic disease, consideration for cardiac issues including consideration towards congestive heart failure.  Infectious etiologies appear less likely.    EKG obtained on presentation has been personally reviewed and interpreted by me, profound left ventricular hypertrophy otherwise no findings of acute ischemia or infarction  on this EKG.  No findings concerning for right ventricular dysfunction.    Chest x-ray obtained on presentation has been personally reviewed and interpreted by me, emphysematous lungs without any other acute concerns based upon my evaluation.    Negative procalcitonin, no evidence of leukocytosis, patient afebrile rules out an infectious etiology    At this time patient will be admitted to the telemetry unit  Ongoing infectious evaluations to be obtained, blood cultures, sputum cultures  COPD exacerbation protocol, management of COPD  Evaluation for venous thrombi embolic disease, complaints of left leg pain, recent travel, CTA PE and DVT duplex requested  Obtain echocardiogram to rule out cardiac concerns    At this time avoid empiric anti-infective therapy  At this time avoid IV fluid hydration until echocardiogram resulted, continue close clinical monitoring    Transaminitis- (present on admission)  Assessment & Plan  With hyperbilirubinemia    Creatinine highly consistent with alcoholic hepatitis with AST elevation, though patient in denial    Will obtain ultrasound right upper quadrant, hepatitis panel for further evaluation    We will check INR, monitor LFTs and INR    Elevated troponin- (present on admission)  Assessment & Plan  With elevated BNP  Chest pain protocol initiated, aspirin 325, cycle troponins, serial EKGs  Given use of aspirin, alcohol, prednisone, omeprazole/sucralfate for GI prophylaxis  Monitor on telemetry    EKG reviewed as discussed above    Differential diagnosis considered include: Acute pulmonary embolism, demand ischemia in the setting of respiratory failure/left ventricular hypertrophy, congestive heart failure, others    Continue to monitor troponins, monitor on telemetry, obtain echocardiogram for further evaluation  Evaluation for venous thromboembolic disease    Based on above, if diagnosis remains unclear, stress test/cardiology consultation may be considered    COPD with  exacerbation (HCC)- (present on admission)  Assessment & Plan  Acute COPD exacerbation, COPD protocol initiated  Prednisone 50 ordered  Doxycycline 100 twice daily  Bronchodilator therapy, RT protocol  Counseled and educated regarding smoking cessation  Needs outpatient PFTs, pulmonology follow-up    Homelessness  Assessment & Plan  Would be difficult to set up with home Oxygen, resides at [facility] currently.  Will attempt to wean off O2 in a.m.    Chronic hepatitis C without hepatic coma (HCC)  Assessment & Plan  Hepatitis C on panel.  transaminitis  History of heroin IVDA.    Lactic acidosis- (present on admission)  Assessment & Plan  I believe this may be secondary to alcohol consumption, potentially contribution from underlying respiratory issues.  I do not see any evidence of infectious issues.  This does not reflect sepsis.    Patient will be initiated on sepsis protocol for monitoring, this does not indicate presence of sepsis.    Hyperproteinemia- (present on admission)  Assessment & Plan  Incidental finding, check SPEP, UPEP    Hypokalemia- (present on admission)  Assessment & Plan  Replaced    Macrocytosis without anemia- (present on admission)  Assessment & Plan  With thrombocytopenia, suspect secondary to alcoholism  We will check B12, folate, TSH    History of heroin abuse- (present on admission)  Assessment & Plan  In remission, on Suboxone  Continue Suboxone therapy    Tobacco abuse- (present on admission)  Assessment & Plan  Counseled and educated regarding cessation    Alcohol dependence with unspecified alcohol-induced disorder (HCC)- (present on admission)  Assessment & Plan  Patient not honest regarding his underlying alcohol abuse  We will give x1 IV thiamine  [facility] protocol for monitoring of withdrawal, supplementation of benzodiazepines per CIWA protocol  Counseled and educated  Multivitamin support  Monitor electrolytes       VTE prophylaxis: lovenox

## 2019-09-21 NOTE — PROGRESS NOTES
2 RN skin check complete with Yessica NICE.   Devices in place O2 tubing.  Skin assessed under devices Yes.  Confirmed pressure ulcers found on NA.  New potential pressure ulcers noted on NA. Wound consult placed NA.    The following interventions in place Pt encouraged to turn self side to side, grey foam pads, moisturizer.    Redness behind BL ears, blanching.  Sacrum and elbows pink and blanching.  Feet dry, calloused and cracking BL, heels blanching.

## 2019-09-21 NOTE — PROGRESS NOTES
Bedside report received.  POC discussed with patient.  Patient verbalized understanding.  Call light and belongings with in reach.  Bed locked and in lowest position, alarm and fall precautions in place.  All needs are met at this time.

## 2019-09-21 NOTE — CARE PLAN
Problem: Safety  Goal: Will remain free from injury  Outcome: PROGRESSING AS EXPECTED   Pt understands the importance of calling for assistance with ambulation. Call light within reach. Bed locked and in lowest position.

## 2019-09-22 VITALS
TEMPERATURE: 97.7 F | HEART RATE: 75 BPM | BODY MASS INDEX: 21.52 KG/M2 | SYSTOLIC BLOOD PRESSURE: 103 MMHG | DIASTOLIC BLOOD PRESSURE: 68 MMHG | OXYGEN SATURATION: 92 % | RESPIRATION RATE: 14 BRPM | HEIGHT: 67 IN | WEIGHT: 137.13 LBS

## 2019-09-22 PROBLEM — I27.20 PULMONARY HYPERTENSION (HCC): Status: ACTIVE | Noted: 2019-09-22

## 2019-09-22 PROBLEM — E87.6 HYPOKALEMIA: Status: RESOLVED | Noted: 2019-09-20 | Resolved: 2019-09-22

## 2019-09-22 PROBLEM — I71.21 ASCENDING AORTIC ANEURYSM (HCC): Status: ACTIVE | Noted: 2019-09-22

## 2019-09-22 PROBLEM — E88.09 HYPERPROTEINEMIA: Status: RESOLVED | Noted: 2019-09-20 | Resolved: 2019-09-22

## 2019-09-22 PROBLEM — R79.89 ELEVATED TROPONIN: Status: RESOLVED | Noted: 2019-09-20 | Resolved: 2019-09-22

## 2019-09-22 PROBLEM — E87.20 LACTIC ACIDOSIS: Status: RESOLVED | Noted: 2019-09-20 | Resolved: 2019-09-22

## 2019-09-22 PROBLEM — I35.1 AORTIC VALVE INSUFFICIENCY: Status: ACTIVE | Noted: 2019-09-22

## 2019-09-22 PROBLEM — J96.01 ACUTE HYPOXEMIC RESPIRATORY FAILURE (HCC): Status: RESOLVED | Noted: 2019-09-20 | Resolved: 2019-09-22

## 2019-09-22 PROBLEM — D75.89 MACROCYTOSIS WITHOUT ANEMIA: Status: RESOLVED | Noted: 2019-09-20 | Resolved: 2019-09-22

## 2019-09-22 LAB
APPEARANCE UR: CLEAR
BACTERIA #/AREA URNS HPF: NEGATIVE /HPF
BILIRUB UR QL STRIP.AUTO: NEGATIVE
COLOR UR: YELLOW
EPI CELLS #/AREA URNS HPF: NEGATIVE /HPF
GLUCOSE UR STRIP.AUTO-MCNC: NEGATIVE MG/DL
HCV RNA SERPL NAA+PROBE-ACNC: ABNORMAL IU/ML
HCV RNA SERPL NAA+PROBE-LOG IU: 6.9 LOG IU/ML
HCV RNA SERPL QL NAA+PROBE: DETECTED
HYALINE CASTS #/AREA URNS LPF: ABNORMAL /LPF
KETONES UR STRIP.AUTO-MCNC: NEGATIVE MG/DL
LEUKOCYTE ESTERASE UR QL STRIP.AUTO: ABNORMAL
MICRO URNS: ABNORMAL
NITRITE UR QL STRIP.AUTO: NEGATIVE
PH UR STRIP.AUTO: 7 [PH] (ref 5–8)
PROT UR QL STRIP: NEGATIVE MG/DL
RBC # URNS HPF: ABNORMAL /HPF
RBC UR QL AUTO: NEGATIVE
SP GR UR STRIP.AUTO: 1.02
UROBILINOGEN UR STRIP.AUTO-MCNC: 1 MG/DL
WBC #/AREA URNS HPF: ABNORMAL /HPF

## 2019-09-22 PROCEDURE — 99239 HOSP IP/OBS DSCHRG MGMT >30: CPT | Performed by: HOSPITALIST

## 2019-09-22 PROCEDURE — 81001 URINALYSIS AUTO W/SCOPE: CPT

## 2019-09-22 PROCEDURE — A9270 NON-COVERED ITEM OR SERVICE: HCPCS | Performed by: INTERNAL MEDICINE

## 2019-09-22 PROCEDURE — 700101 HCHG RX REV CODE 250: Performed by: HOSPITALIST

## 2019-09-22 PROCEDURE — 94668 MNPJ CHEST WALL SBSQ: CPT

## 2019-09-22 PROCEDURE — 94760 N-INVAS EAR/PLS OXIMETRY 1: CPT

## 2019-09-22 PROCEDURE — 94640 AIRWAY INHALATION TREATMENT: CPT

## 2019-09-22 PROCEDURE — 700102 HCHG RX REV CODE 250 W/ 637 OVERRIDE(OP): Performed by: INTERNAL MEDICINE

## 2019-09-22 PROCEDURE — 700111 HCHG RX REV CODE 636 W/ 250 OVERRIDE (IP): Performed by: INTERNAL MEDICINE

## 2019-09-22 PROCEDURE — A9270 NON-COVERED ITEM OR SERVICE: HCPCS | Performed by: HOSPITALIST

## 2019-09-22 PROCEDURE — 700102 HCHG RX REV CODE 250 W/ 637 OVERRIDE(OP): Performed by: HOSPITALIST

## 2019-09-22 PROCEDURE — 97161 PT EVAL LOW COMPLEX 20 MIN: CPT

## 2019-09-22 RX ORDER — LEVALBUTEROL TARTRATE 45 UG/1
1-2 AEROSOL, METERED ORAL EVERY 4 HOURS PRN
Qty: 1 INHALER | Refills: 3 | Status: SHIPPED | OUTPATIENT
Start: 2019-09-22 | End: 2019-09-22

## 2019-09-22 RX ORDER — ALBUTEROL SULFATE 90 UG/1
2 AEROSOL, METERED RESPIRATORY (INHALATION) EVERY 6 HOURS PRN
Qty: 8.5 G | Refills: 3 | Status: SHIPPED | OUTPATIENT
Start: 2019-09-22 | End: 2019-09-22

## 2019-09-22 RX ORDER — PREDNISONE 20 MG/1
40 TABLET ORAL DAILY
Qty: 8 TAB | Refills: 0 | Status: SHIPPED | OUTPATIENT
Start: 2019-09-23 | End: 2019-09-26

## 2019-09-22 RX ORDER — ALBUTEROL SULFATE 90 UG/1
2 AEROSOL, METERED RESPIRATORY (INHALATION) EVERY 4 HOURS PRN
Qty: 8.5 G | Refills: 3 | Status: SHIPPED | OUTPATIENT
Start: 2019-09-22

## 2019-09-22 RX ORDER — ALBUTEROL SULFATE 90 UG/1
2 AEROSOL, METERED RESPIRATORY (INHALATION) EVERY 6 HOURS PRN
Qty: 8.5 G | Refills: 3 | Status: SHIPPED | OUTPATIENT
Start: 2019-09-22 | End: 2019-09-22 | Stop reason: SDUPTHER

## 2019-09-22 RX ORDER — BUDESONIDE AND FORMOTEROL FUMARATE DIHYDRATE 80; 4.5 UG/1; UG/1
2 AEROSOL RESPIRATORY (INHALATION)
Status: DISCONTINUED | OUTPATIENT
Start: 2019-09-22 | End: 2019-09-22 | Stop reason: HOSPADM

## 2019-09-22 RX ORDER — BUDESONIDE AND FORMOTEROL FUMARATE DIHYDRATE 80; 4.5 UG/1; UG/1
2 AEROSOL RESPIRATORY (INHALATION) 2 TIMES DAILY
Qty: 1 INHALER | Refills: 3 | Status: SHIPPED | OUTPATIENT
Start: 2019-09-22

## 2019-09-22 RX ORDER — DOXYCYCLINE 100 MG/1
100 TABLET ORAL EVERY 12 HOURS
Qty: 6 TAB | Refills: 0 | Status: SHIPPED | OUTPATIENT
Start: 2019-09-22 | End: 2019-09-25

## 2019-09-22 RX ORDER — TIOTROPIUM BROMIDE 18 UG/1
18 CAPSULE ORAL; RESPIRATORY (INHALATION) DAILY
Qty: 30 CAP | Refills: 3 | Status: SHIPPED | OUTPATIENT
Start: 2019-09-22 | End: 2019-09-22

## 2019-09-22 RX ORDER — NICOTINE 21 MG/24HR
1 PATCH, TRANSDERMAL 24 HOURS TRANSDERMAL EVERY 24 HOURS
Qty: 30 PATCH | Refills: 3 | Status: SHIPPED | OUTPATIENT
Start: 2019-09-22 | End: 2019-09-22

## 2019-09-22 RX ORDER — NICOTINE 21 MG/24HR
1 PATCH, TRANSDERMAL 24 HOURS TRANSDERMAL EVERY 24 HOURS
Qty: 30 PATCH | Refills: 3 | Status: SHIPPED | OUTPATIENT
Start: 2019-09-22 | End: 2019-09-22 | Stop reason: SDUPTHER

## 2019-09-22 RX ORDER — BUDESONIDE AND FORMOTEROL FUMARATE DIHYDRATE 160; 4.5 UG/1; UG/1
2 AEROSOL RESPIRATORY (INHALATION) 2 TIMES DAILY
Qty: 1 INHALER | Refills: 3 | Status: SHIPPED | OUTPATIENT
Start: 2019-09-22 | End: 2019-09-22

## 2019-09-22 RX ORDER — PREDNISONE 20 MG/1
40 TABLET ORAL DAILY
Qty: 8 TAB | Refills: 0 | Status: SHIPPED | OUTPATIENT
Start: 2019-09-23 | End: 2019-09-22 | Stop reason: SDUPTHER

## 2019-09-22 RX ORDER — ALBUTEROL SULFATE 90 UG/1
2 AEROSOL, METERED RESPIRATORY (INHALATION) EVERY 4 HOURS PRN
Status: DISCONTINUED | OUTPATIENT
Start: 2019-09-22 | End: 2019-09-22 | Stop reason: HOSPADM

## 2019-09-22 RX ORDER — BUDESONIDE AND FORMOTEROL FUMARATE DIHYDRATE 160; 4.5 UG/1; UG/1
2 AEROSOL RESPIRATORY (INHALATION) 2 TIMES DAILY
Qty: 1 INHALER | Refills: 3 | Status: SHIPPED | OUTPATIENT
Start: 2019-09-22 | End: 2019-09-22 | Stop reason: SDUPTHER

## 2019-09-22 RX ADMIN — NICOTINE 14 MG: 14 PATCH TRANSDERMAL at 05:53

## 2019-09-22 RX ADMIN — CLONIDINE HYDROCHLORIDE 0.3 MG: 0.1 TABLET ORAL at 05:52

## 2019-09-22 RX ADMIN — LISINOPRIL 10 MG: 5 TABLET ORAL at 05:52

## 2019-09-22 RX ADMIN — OMEPRAZOLE 20 MG: 20 CAPSULE, DELAYED RELEASE ORAL at 05:52

## 2019-09-22 RX ADMIN — THERA TABS 1 TABLET: TAB at 05:52

## 2019-09-22 RX ADMIN — ENOXAPARIN SODIUM 40 MG: 100 INJECTION SUBCUTANEOUS at 05:54

## 2019-09-22 RX ADMIN — LORAZEPAM 0.5 MG: 1 TABLET ORAL at 05:52

## 2019-09-22 RX ADMIN — LORAZEPAM 1 MG: 1 TABLET ORAL at 01:13

## 2019-09-22 RX ADMIN — ASPIRIN 325 MG: 325 TABLET, FILM COATED ORAL at 05:52

## 2019-09-22 RX ADMIN — LORAZEPAM 1 MG: 1 TABLET ORAL at 08:32

## 2019-09-22 RX ADMIN — Medication 100 MG: at 05:52

## 2019-09-22 RX ADMIN — SUCRALFATE 1 G: 1 SUSPENSION ORAL at 05:53

## 2019-09-22 RX ADMIN — IPRATROPIUM BROMIDE AND ALBUTEROL SULFATE 3 ML: .5; 3 SOLUTION RESPIRATORY (INHALATION) at 07:19

## 2019-09-22 RX ADMIN — BUPRENORPHINE HYDROCHLORIDE AND NALOXONE HYDROCHLORIDE DIHYDRATE 1 TABLET: 8; 2 TABLET SUBLINGUAL at 06:06

## 2019-09-22 RX ADMIN — SUCRALFATE 1 G: 1 SUSPENSION ORAL at 01:13

## 2019-09-22 RX ADMIN — DOXYCYCLINE 100 MG: 100 TABLET, FILM COATED ORAL at 05:52

## 2019-09-22 RX ADMIN — SUCRALFATE 1 G: 1 SUSPENSION ORAL at 12:42

## 2019-09-22 RX ADMIN — GABAPENTIN 600 MG: 300 CAPSULE ORAL at 05:52

## 2019-09-22 RX ADMIN — GABAPENTIN 600 MG: 300 CAPSULE ORAL at 12:42

## 2019-09-22 RX ADMIN — PREDNISONE 50 MG: 50 TABLET ORAL at 05:52

## 2019-09-22 RX ADMIN — FOLIC ACID 1 MG: 1 TABLET ORAL at 05:52

## 2019-09-22 ASSESSMENT — LIFESTYLE VARIABLES
ANXIETY: MILDLY ANXIOUS
TOTAL SCORE: 9
ANXIETY: NO ANXIETY (AT EASE)
NAUSEA AND VOMITING: NO NAUSEA AND NO VOMITING
AGITATION: NORMAL ACTIVITY
PAROXYSMAL SWEATS: BARELY PERCEPTIBLE SWEATING. PALMS MOIST
ORIENTATION AND CLOUDING OF SENSORIUM: ORIENTED AND CAN DO SERIAL ADDITIONS
NAUSEA AND VOMITING: *
TOTAL SCORE: VERY MILD ITCHING, PINS AND NEEDLES SENSATION, BURNING OR NUMBNESS
TREMOR: TREMOR NOT VISIBLE BUT CAN BE FELT, FINGERTIP TO FINGERTIP
AUDITORY DISTURBANCES: NOT PRESENT
HEADACHE, FULLNESS IN HEAD: VERY MILD
TOTAL SCORE: 8
NAUSEA AND VOMITING: NO NAUSEA AND NO VOMITING
TREMOR: *
HEADACHE, FULLNESS IN HEAD: VERY MILD
VISUAL DISTURBANCES: MILD SENSITIVITY
AUDITORY DISTURBANCES: NOT PRESENT
AUDITORY DISTURBANCES: NOT PRESENT
ANXIETY: MILDLY ANXIOUS
ANXIETY: *
TOTAL SCORE: 2
TOTAL SCORE: 5
PAROXYSMAL SWEATS: NO SWEAT VISIBLE
ORIENTATION AND CLOUDING OF SENSORIUM: ORIENTED AND CAN DO SERIAL ADDITIONS
HEADACHE, FULLNESS IN HEAD: VERY MILD
TREMOR: *
NAUSEA AND VOMITING: NO NAUSEA AND NO VOMITING
AGITATION: NORMAL ACTIVITY
VISUAL DISTURBANCES: NOT PRESENT
PAROXYSMAL SWEATS: BARELY PERCEPTIBLE SWEATING. PALMS MOIST
VISUAL DISTURBANCES: MILD SENSITIVITY
AUDITORY DISTURBANCES: NOT PRESENT
ORIENTATION AND CLOUDING OF SENSORIUM: ORIENTED AND CAN DO SERIAL ADDITIONS
ORIENTATION AND CLOUDING OF SENSORIUM: ORIENTED AND CAN DO SERIAL ADDITIONS
AGITATION: SOMEWHAT MORE THAN NORMAL ACTIVITY
PAROXYSMAL SWEATS: BARELY PERCEPTIBLE SWEATING. PALMS MOIST
HEADACHE, FULLNESS IN HEAD: NOT PRESENT
TREMOR: TREMOR NOT VISIBLE BUT CAN BE FELT, FINGERTIP TO FINGERTIP
VISUAL DISTURBANCES: NOT PRESENT
AGITATION: NORMAL ACTIVITY

## 2019-09-22 ASSESSMENT — GAIT ASSESSMENTS
DISTANCE (FEET): 200
DEVIATION: ATAXIC
ASSISTIVE DEVICE: FRONT WHEEL WALKER
GAIT LEVEL OF ASSIST: SUPERVISED

## 2019-09-22 ASSESSMENT — COGNITIVE AND FUNCTIONAL STATUS - GENERAL
STANDING UP FROM CHAIR USING ARMS: A LITTLE
MOBILITY SCORE: 20
CLIMB 3 TO 5 STEPS WITH RAILING: A LOT
SUGGESTED CMS G CODE MODIFIER MOBILITY: CJ
WALKING IN HOSPITAL ROOM: A LITTLE

## 2019-09-22 NOTE — PROGRESS NOTES
Pt refusing bed alarm, pt educated on fall risk.  Educated pt on calling before getting out of bed.  Pt verbalized understanding.

## 2019-09-22 NOTE — DISCHARGE INSTRUCTIONS
Discharge Instructions    Discharged to homeless shelter by Bus with self. Discharged via wheelchair, hospital escort: Yes.  Special equipment needed: Walker    Be sure to schedule a follow-up appointment with your primary care doctor or any specialists as instructed.     Discharge Plan:   Diet Plan: Discussed  Activity Level: Discussed  Smoking Cessation Offered: Patient Refused  Confirmed Follow up Appointment: Appointment Scheduled  Confirmed Symptoms Management: Discussed  Medication Reconciliation Updated: Yes  Influenza Vaccine Indication: Indicated: 65 years and older  Influenza Vaccine Given - only chart on this line when given: Influenza Vaccine Given (See MAR)    I understand that a diet low in cholesterol, fat, and sodium is recommended for good health. Unless I have been given specific instructions below for another diet, I accept this instruction as my diet prescription.   Other diet:   Heart-Healthy Eating Plan  Introduction  Heart-healthy meal planning includes:  · Limiting unhealthy fats.  · Increasing healthy fats.  · Making other small dietary changes.  You may need to talk with your doctor or a diet specialist (dietitian) to create an eating plan that is right for you.  What types of fat should I choose?  · Choose healthy fats. These include olive oil and canola oil, flaxseeds, walnuts, almonds, and seeds.  · Eat more omega-3 fats. These include salmon, mackerel, sardines, tuna, flaxseed oil, and ground flaxseeds. Try to eat fish at least twice each week.  · Limit saturated fats.  ¨ Saturated fats are often found in animal products, such as meats, butter, and cream.  ¨ Plant sources of saturated fats include palm oil, palm kernel oil, and coconut oil.  · Avoid foods with partially hydrogenated oils in them. These include stick margarine, some tub margarines, cookies, crackers, and other baked goods. These contain trans fats.  What general guidelines do I need to follow?  · Check food labels  "carefully. Identify foods with trans fats or high amounts of saturated fat.  · Fill one half of your plate with vegetables and green salads. Eat 4-5 servings of vegetables per day. A serving of vegetables is:  ¨ 1 cup of raw leafy vegetables.  ¨ ½ cup of raw or cooked cut-up vegetables.  ¨ ½ cup of vegetable juice.  · Fill one fourth of your plate with whole grains. Look for the word \"whole\" as the first word in the ingredient list.  · Fill one fourth of your plate with lean protein foods.  · Eat 4-5 servings of fruit per day. A serving of fruit is:  ¨ One medium whole fruit.  ¨ ¼ cup of dried fruit.  ¨ ½ cup of fresh, frozen, or canned fruit.  ¨ ½ cup of 100% fruit juice.  · Eat more foods that contain soluble fiber. These include apples, broccoli, carrots, beans, peas, and barley. Try to get 20-30 g of fiber per day.  · Eat more home-cooked food. Eat less restaurant, buffet, and fast food.  · Limit or avoid alcohol.  · Limit foods high in starch and sugar.  · Avoid fried foods.  · Avoid frying your food. Try baking, boiling, grilling, or broiling it instead. You can also reduce fat by:  ¨ Removing the skin from poultry.  ¨ Removing all visible fats from meats.  ¨ Skimming the fat off of stews, soups, and gravies before serving them.  ¨ Steaming vegetables in water or broth.  · Lose weight if you are overweight.  · Eat 4-5 servings of nuts, legumes, and seeds per week:  ¨ One serving of dried beans or legumes equals ½ cup after being cooked.  ¨ One serving of nuts equals 1½ ounces.  ¨ One serving of seeds equals ½ ounce or one tablespoon.  · You may need to keep track of how much salt or sodium you eat. This is especially true if you have high blood pressure. Talk with your doctor or dietitian to get more information.  What foods can I eat?  Grains   Breads, including Senegalese, white, gabe, wheat, raisin, rye, oatmeal, and Italian. Tortillas that are neither fried nor made with lard or trans fat. Low-fat rolls, " including hotdog and hamburger buns and English muffins. Biscuits. Muffins. Waffles. Pancakes. Light popcorn. Whole-grain cereals. Flatbread. Rachelle toast. Pretzels. Breadsticks. Rusks. Low-fat snacks. Low-fat crackers, including oyster, saltine, matzo, rene, animal, and rye. Rice and pasta, including brown rice and pastas that are made with whole wheat.  Vegetables   All vegetables.  Fruits   All fruits, but limit coconut.  Meats and Other Protein Sources   Lean, well-trimmed beef, veal, pork, and lamb. Chicken and turkey without skin. All fish and shellfish. Wild duck, rabbit, pheasant, and venison. Egg whites or low-cholesterol egg substitutes. Dried beans, peas, lentils, and tofu. Seeds and most nuts.  Dairy   Low-fat or nonfat cheeses, including ricotta, string, and mozzarella. Skim or 1% milk that is liquid, powdered, or evaporated. Buttermilk that is made with low-fat milk. Nonfat or low-fat yogurt.  Beverages   Mineral water. Diet carbonated beverages.  Sweets and Desserts   Sherbets and fruit ices. Honey, jam, marmalade, jelly, and syrups. Meringues and gelatins. Pure sugar candy, such as hard candy, jelly beans, gumdrops, mints, marshmallows, and small amounts of dark chocolate. Milo food cake.  Eat all sweets and desserts in moderation.  Fats and Oils   Nonhydrogenated (trans-free) margarines. Vegetable oils, including soybean, sesame, sunflower, olive, peanut, safflower, corn, canola, and cottonseed. Salad dressings or mayonnaise made with a vegetable oil. Limit added fats and oils that you use for cooking, baking, salads, and as spreads.  Other   Cocoa powder. Coffee and tea. All seasonings and condiments.  The items listed above may not be a complete list of recommended foods or beverages. Contact your dietitian for more options.   What foods are not recommended?  Grains   Breads that are made with saturated or trans fats, oils, or whole milk. Croissants. Butter rolls. Cheese breads. Sweet rolls.  Donuts. Buttered popcorn. Chow mein noodles. High-fat crackers, such as cheese or butter crackers.  Meats and Other Protein Sources   Fatty meats, such as hotdogs, short ribs, sausage, spareribs, ash, rib eye roast or steak, and mutton. High-fat deli meats, such as salami and bologna. Caviar. Domestic duck and goose. Organ meats, such as kidney, liver, sweetbreads, and heart.  Dairy   Cream, sour cream, cream cheese, and creamed cottage cheese. Whole-milk cheeses, including blue (josey), [place], Brie, Jimmie, American, Havarti, Swiss, cheddar, Camembert, and [place]. Whole or 2% milk that is liquid, evaporated, or condensed. Whole buttermilk. Cream sauce or high-fat cheese sauce. Yogurt that is made from whole milk.  Beverages   Regular sodas and juice drinks with added sugar.  Sweets and Desserts   Frosting. Pudding. Cookies. Cakes other than eric food cake. Candy that has milk chocolate or white chocolate, hydrogenated fat, butter, coconut, or unknown ingredients. Buttered syrups. Full-fat ice cream or ice cream drinks.  Fats and Oils   Gravy that has suet, meat fat, or shortening. Cocoa butter, hydrogenated oils, palm oil, coconut oil, palm kernel oil. These can often be found in baked products, candy, fried foods, nondairy creamers, and whipped toppings. Solid fats and shortenings, including ash fat, salt pork, lard, and butter. Nondairy cream substitutes, such as coffee creamers and sour cream substitutes. Salad dressings that are made of unknown oils, cheese, or sour cream.  The items listed above may not be a complete list of foods and beverages to avoid. Contact your dietitian for more information.   This information is not intended to replace advice given to you by your health care provider. Make sure you discuss any questions you have with your health care provider.  Document Released: 06/18/2013 Document Revised: 05/25/2017 Document Reviewed: 06/11/2015  © 2017 Elsevier      Special  Instructions: None    · Is patient discharged on Warfarin / Coumadin?   No     Depression / Suicide Risk    As you are discharged from this [facility] facility, it is important to learn how to keep safe from harming yourself.    Recognize the warning signs:  · Abrupt changes in personality, positive or negative- including increase in energy   · Giving away possessions  · Change in eating patterns- significant weight changes-  positive or negative  · Change in sleeping patterns- unable to sleep or sleeping all the time   · Unwillingness or inability to communicate  · Depression  · Unusual sadness, discouragement and loneliness  · Talk of wanting to die  · Neglect of personal appearance   · Rebelliousness- reckless behavior  · Withdrawal from people/activities they love  · Confusion- inability to concentrate     If you or a loved one observes any of these behaviors or has concerns about self-harm, here's what you can do:  · Talk about it- your feelings and reasons for harming yourself  · Remove any means that you might use to hurt yourself (examples: pills, rope, extension cords, firearm)  · Get professional help from the community (Mental Health, Substance Abuse, psychological counseling)  · Do not be alone:Call your Safe Contact- someone whom you trust who will be there for you.  · Call your local CRISIS HOTLINE 392-4044 or 646-071-7088  · Call your local Children's Mobile Crisis Response Team [place] (671) 211-5982 or www.[facility]  · Call the toll free National Suicide Prevention Hotlines   · National Suicide Prevention Lifeline 793-365-HAWX (9083)  · National Hope Line Network 800-SUICIDE (218-7765)

## 2019-09-22 NOTE — DISCHARGE PLANNING
RN CM spoke with [facility] pharmacy who quoted the following for approved services: Symbicort $435.15, Albuterol-$79.69, Prednisone--$28.25.  Dr. Alfredo notified and she changed some of the meds that she sent to pharmacy to get price quotes due to high cost of previously sent meds. RN CM called and left voice mail for pharmacy to return call with costs.

## 2019-09-22 NOTE — DISCHARGE PLANNING
Anticipated Discharge Disposition: shelter    Action: RN CM talked with patient who arrived in [place] via [employer] and currently has no money. He gets paid on the 1st from [employer] $1018. His plan is to discharge to the shelter and states that depending on what time he is discharged he may have a ride from a friend. His meds were originally sent to [facility] but they do not have symbicort or spiriva in stock and the spiriva is not covered under his insurance. RN CM discussed with Dr. Alfredo who will be sending scripts to [facility] ? pharmacy on [address] and this RN CM will check cost for approved services.    Barriers to Discharge: meds arranged    Plan: RN CM to check cost of meds for approved services since patient has no money at this time.    Care Transition Team Assessment    Information Source  Orientation : Oriented x 4  Information Given By: Patient  Who is responsible for making decisions for patient? : Patient    Readmission Evaluation  Is this a readmission?: No    Elopement Risk  Legal Hold: No  Ambulatory or Self Mobile in Wheelchair: Yes  Disoriented: No  Psychiatric Symptoms: None  History of Wandering: No  Elopement this Admit: No  Vocalizing Wanting to Leave: No  Displays Behaviors, Body Language Wanting to Leave: No-Not at Risk for Elopement  Elopement Risk: Not at Risk for Elopement    Interdisciplinary Discharge Planning  Does Admitting Nurse Feel This Could be a Complex Discharge?: No  Lives with - Patient's Self Care Capacity: Other (Comments)  Patient or legal guardian wants to designate a caregiver (see row info): No  Support Systems: Friends / Neighbors  Housing / Facility: Homeless  Able to Return to Previous ADL's: Yes  Mobility Issues: No  Prior Services: None  Patient Expects to be Discharged to:: shelter  Assistance Needed: Yes  Durable Medical Equipment: Not Applicable    Discharge Preparedness  What is your plan after discharge?: Other (comment)(shelter)  Prior Functional Level: Ambulatory,  Independent with Activities of Daily Living    Functional Assesment  Prior Functional Level: Ambulatory, Independent with Activities of Daily Living    Finances  Financial Barriers to Discharge: Yes  Average Monthly Income: 1018 $  Source of Income: Social Security Disability  Prescription Coverage: Yes    Vision / Hearing Impairment  Vision Impairment : No  Right Eye Vision: Blind  Left Eye Vision: Impaired, Wears Glasses  Hearing Impairment : No    Domestic Abuse  Have you ever been the victim of abuse or violence?: No  Physical Abuse or Sexual Abuse: No  Verbal Abuse or Emotional Abuse: No  Possible Abuse Reported to:: Not Applicable    Discharge Risks or Barriers  Discharge risks or barriers?: Homeless / couch surfing  Patient risk factors: Homeless    Anticipated Discharge Information  Anticipated discharge disposition: Shelter  Discharge Address: [address] [place] [address] [place] [address]

## 2019-09-22 NOTE — DISCHARGE PLANNING
RN CM sent approved service to [facility] for prednisone $28.25. Per Dr. Alfredo, patient can take inhalers with him that were ordered for him to take during admission for symbicort and albuterol. Bedside RN aware.  Patient has ride from friends that state they can take him to [facility] to get his med before taking him to the shelter.

## 2019-09-22 NOTE — ASSESSMENT & PLAN NOTE
Would be difficult to set up with home Oxygen, resides at [facility] currently.  Will attempt to wean off O2 in a.m.

## 2019-09-22 NOTE — THERAPY
"Physical Therapy Evaluation completed.   Bed Mobility:  Supine to Sit: Independent  Transfers: Sit to Stand: Supervised  Gait: Level Of Assist: Supervised with Front-Wheel Walker       Plan of Care: Patient with no further skilled PT needs in the acute care setting at this time  Discharge Recommendations: Equipment: Front-Wheel Walker. Post-acute therapy Currently anticipate no further skilled therapy needs once patient is discharged from the inpatient setting.    See \"Rehab Therapy-Acute\" Patient Summary Report for complete documentation.     Pt is a 65 y/o male presenting to acute setting with c/o SOB.  Pt is homeless and is planning at going to the shelter upon DC.  Pt with noticeable ataxia with ambulation but reports this is his baseline.  Pt with a hx of drug and EtOH abuse which is most likely the cause of his ataxia.  Pt would benefit from FWW at [place], no further acute PT needs at this time, no PT needs at [facility].   "

## 2019-09-22 NOTE — DISCHARGE PLANNING
Bill from [facility] pharmacy is still processing meds for patient and will call this RN CM back with cost quotes.

## 2019-09-22 NOTE — PROGRESS NOTES
Assumed care of pt, he is sleeping in bed. No signs of distress. Discussed POC with NOC RN. Bed is locked in lowest position and call light/personal belongings are within reach.

## 2019-09-22 NOTE — PROGRESS NOTES
Echocardiogram resulted yesterday at 1301. Await clarification of absence or presence of HFpEF from Dr. Alfredo.    Structural issues noted:    Moderate AI  Mild TR  RVSP 35 mmHg  Enlarged RA  Severely dilated LA    Thank you, [place], Cardiovascular Nurse Navigator, RN, CHFN [phone]

## 2019-09-22 NOTE — DISCHARGE SUMMARY
"Discharge Summary    CHIEF COMPLAINT ON ADMISSION  Chief Complaint   Patient presents with   • Shortness of Breath     Pt awoke at 0100 with SOB, has had productive cough with yellow sputum x 5D, \"last time I felt like this I had pneumonia\"       Reason for Admission  SOB     Admission Date  9/20/2019    CODE STATUS  Full Code    HPI & HOSPITAL COURSE  This is a 64 y.o. male here with SOB, cough.  9/21:   This 64-year-old male revealed no med who has moved from several areas including [place] in [facility] recently arrived by [employer] to the homeless shelter in [place], hepatitis C positive , he is on chronic Suboxone therapy for IV heroin abuse, known COPD but not currently on home oxygen, recent admission for recurrent pneumonia per patient presents to the emergency room with productive yellow sputum and shortness of breath forward smoker and alcoholic.  Chest x-ray with COPD, CTA chest negative, ultrasound right upper quadrant with fatty infiltration of the liver, echo pending ultrasound lower extremities pending.  BNP elevated 2416.  Patient denies chest pain.  Troponin 43.  He is normal sinus rhythm on the monitor.  Currently on 1 L oxygen satting 95% on room air.  He was started on prednisone 50 mg daily as well as doxycycline on admission.*     He was comfortable on all exams, sleeping supine, weaned to RA at discharge.  He has no evidence of heart failure on exam or echocardiogram.  EF 60%, evidence of mild pulmonary hypertension from COPD with RVP 35 and moderate AI, dilated left atrium.  He has been advised to stop smoking.  He has a 4.1 cm ascending aortic aneurysm that will need follow up monitoring q 6 months.  He was not given any diuretics.  His COPD was treated with symbicort, albuterol inhalers, prednisone and doxycycline for bronchitis.  DORY is to arrange for transportation to shelter.  DORY called in all medications and Medicare co-pay was $500 for spiriva.  Patient did not need spiriva as much as " his albuterol prn and symbicort inhalers.  He continues to smoke, nicotine patches ordered and cessation advised.    Due to patient unable to afford his medications, symbicort was changed to advair, albuterol changed to xopenex, patient refuses to stop smoking, so dc'd nicotine patches.    Therefore, he is discharged in good and stable condition to home with close outpatient follow-up.    The patient met 2-midnight criteria for an inpatient stay at the time of discharge.    Discharge Date  9/22/2019    FOLLOW UP ITEMS POST DISCHARGE  Follow up with PCP in 1-2 weeks for recheck.    DISCHARGE DIAGNOSES  Active Problems:    COPD with exacerbation (HCC) POA: Yes    Transaminitis POA: Yes    Chronic hepatitis C without hepatic coma (HCC) POA: Yes    Homelessness POA: Yes    Pulmonary hypertension (HCC) POA: Yes    Aortic valve insufficiency POA: Yes    Ascending aortic aneurysm (HCC) POA: Yes    Alcohol dependence with unspecified alcohol-induced disorder (HCC) POA: Yes    Tobacco abuse POA: Yes    History of heroin abuse POA: Yes  Resolved Problems:    Acute hypoxemic respiratory failure (HCC) POA: Yes    Elevated troponin POA: Yes    Lactic acidosis POA: Yes    Macrocytosis without anemia POA: Yes    Hypokalemia POA: Yes    Hyperproteinemia POA: Yes      FOLLOW UP  No future appointments.  [facility]  [facility]   [address]  [facility] [address]  Phone: [phone]  Go on 9/27/2019  walk in at 8:00 AM  and see a care provider    [address] [address]  [phone]    In 1 day  Walk in for treatment      MEDICATIONS ON DISCHARGE     Medication List      START taking these medications      Instructions   albuterol 108 (90 Base) MCG/ACT Aers inhalation aerosol   Inhale 2 Puffs by mouth every four hours as needed.  Dose:  2 Puff     budesonide-formoterol 80-4.5 MCG/ACT Aero  Commonly known as:  SYMBICORT   Inhale 2 Puffs by mouth 2 Times a  Day.  Dose:  2 Puff     doxycycline monohydrate 100 MG tablet  Commonly known as:  ADOXA   Take 1 Tab by mouth every 12 hours for 3 days.  Dose:  100 mg     predniSONE 20 MG Tabs  Start taking on:  9/23/2019  Commonly known as:  DELTASONE   Take 2 Tabs by mouth every day for 4 days.  Dose:  40 mg        CONTINUE taking these medications      Instructions   buprenorphine-naloxone 8-2 MG Subl  Commonly known as:  SUBOXONE   Place 1 Tab under tongue 2 Times a Day.  Dose:  1 Tab     cloNIDine 0.3 MG Tabs  Commonly known as:  CATAPRESS   Take 0.3 mg by mouth 2 times a day.  Dose:  0.3 mg     gabapentin 600 MG tablet  Commonly known as:  NEURONTIN   Take 600 mg by mouth 3 times a day.  Dose:  600 mg     lisinopril 10 MG Tabs  Commonly known as:  PRINIVIL   Take 10 mg by mouth every day.  Dose:  10 mg     MULTI VITAMIN PO   Take 1 Tab by mouth every day.  Dose:  1 Tab     VITAMIN B COMPLEX PO   Take 1 Tab by mouth every day.  Dose:  1 Tab            Allergies  No Known Allergies    DIET  Orders Placed This Encounter   Procedures   • Diet Order Regular     Standing Status:   Standing     Number of Occurrences:   1     Order Specific Question:   Diet:     Answer:   Regular [1]       ACTIVITY  As tolerated.  Weight bearing as tolerated    CONSULTATIONS  none    PROCEDURES  Transthoracic  Echo Report      Echocardiography Laboratory    CONCLUSIONS  No prior study is available for comparison.   Normal left ventricular systolic function.  Left ventricular ejection fraction is visually estimated to be 60%.  Moderate aortic insufficiency.  Mild tricuspid regurgitation.  Estimated right ventricular systolic pressure is 35 mmHg.    PAULINA MENDOZA  Exam Date:         09/21/2019                      11:48    9/20/2019 3:45 PM    HISTORY/REASON FOR EXAM:  Shortness of breath      TECHNIQUE/EXAM DESCRIPTION:  CT angiogram scan for pulmonary embolism with contrast, with reconstructions.    1.25 mm helical sections were obtained from the  lung apices through the lung bases following the rapid bolus administration of 100 mL of Omnipaque 350 nonionic contrast. Thin-section overlapping reconstruction interval was utilized. Coronal   reconstructions were generated from the axial data. MIP post processing was performed and utilized for the interpretation.    Low dose optimization technique was utilized for this CT exam including automated exposure control and adjustment of the mA and/or kV according to patient size.    COMPARISON: Chest x-ray from 723    FINDINGS:  Pulmonary Embolism: No pulmonary embolus is identified.    Lungs: Bilateral emphysematous change without consolidation or pulmonary nodules.    Pleura: No pleural effusion.    Nodes: No enlarged lymph nodes.    Additional findings: There are dense coronary artery calcifications. The ascending aorta measures approximately 4 x 4.1 cm. No significant pericardial effusion. There is faint aortic valve calcification.    Limited visualization of the upper abdomen demonstrates hypodensity in the partially visualized liver.    Degenerative changes are in the spine. No suspicious bony lesions.   Impression       1.  No pulmonary embolus is identified.    2.  Ascending aortic aneurysm with a maximum diameter 4.1 cm.    3.  Atherosclerosis.    4.  Probable hepatic steatosis       LABORATORY  Lab Results   Component Value Date    SODIUM 127 (L) 09/21/2019    POTASSIUM 4.2 09/21/2019    CHLORIDE 93 (L) 09/21/2019    CO2 28 09/21/2019    GLUCOSE 257 (H) 09/21/2019    BUN 28 (H) 09/21/2019    CREATININE 1.28 09/21/2019        Lab Results   Component Value Date    WBC 12.0 (H) 09/21/2019    HEMOGLOBIN 12.0 (L) 09/21/2019    HEMATOCRIT 35.0 (L) 09/21/2019    PLATELETCT 130 (L) 09/21/2019        Total time of the discharge process exceeds 39 minutes.

## 2019-09-22 NOTE — PROGRESS NOTES
Pt dc'd to shelter. IV and monitor removed; monitor room notified. Pt left unit via walkng with his dog. Personal belongings with pt when leaving unit. Pt given discharge instructions prior to leaving unit including prescription and when to visit with physician; verbalizes understanding. Copy of discharge instructions with pt and in the chart.    Pt refused to wait for Symbicort inhaler. Was given albuterol. Education provide but pt insisted on leaving. Paperwork signed.

## 2019-09-24 LAB
ALBUMIN 24H UR QL ELPH: DETECTED
ALPHA1 GLOB 24H UR QL ELPH: DETECTED
ALPHA2 GLOB 24H UR QL ELPH: DETECTED
B-GLOBULIN UR QL ELPH: DETECTED
COLLECT DURATION TIME SPEC: ABNORMAL H
GAMMA GLOB UR QL ELPH: DETECTED
INTERPRETATION UR IFE-IMP: ABNORMAL
KAPPA LC FREE UR-MCNC: 16.5 MG/DL (ref 0.14–2.42)
KAPPA LC FREE/LAMBDA FREE UR: 11.22 RATIO (ref 2.04–10.37)
LAMBDA LC FREE UR-MCNC: 1.47 MG/DL (ref 0.02–0.67)
PROT 24H UR-MRATE: ABNORMAL MG/D (ref 10–140)
TOTAL VOLUME 1105: ABNORMAL ML

## 2019-09-25 LAB
ALBUMIN SERPL ELPH-MCNC: 3.27 G/DL (ref 3.75–5.01)
ALPHA1 GLOB SERPL ELPH-MCNC: 0.21 G/DL (ref 0.19–0.46)
ALPHA2 GLOB SERPL ELPH-MCNC: 0.75 G/DL (ref 0.48–1.05)
B-GLOBULIN SERPL ELPH-MCNC: 0.83 G/DL (ref 0.48–1.1)
BACTERIA BLD CULT: NORMAL
BACTERIA BLD CULT: NORMAL
GAMMA GLOB SERPL ELPH-MCNC: 2.15 G/DL (ref 0.62–1.51)
IGA SERPL-MCNC: 479 MG/DL (ref 68–408)
IGG SERPL-MCNC: 2280 MG/DL (ref 768–1632)
IGM SERPL-MCNC: 107 MG/DL (ref 35–263)
INTERPRETATION SERPL IFE-IMP: ABNORMAL
MONOCLON BAND OBS SERPL: ABNORMAL
PATHOLOGY STUDY: ABNORMAL
PROT SERPL-MCNC: 7.2 G/DL (ref 6.3–8.2)
SIGNIFICANT IND 70042: NORMAL
SIGNIFICANT IND 70042: NORMAL
SITE SITE: NORMAL
SITE SITE: NORMAL
SOURCE SOURCE: NORMAL
SOURCE SOURCE: NORMAL

## 2019-09-26 ENCOUNTER — APPOINTMENT (OUTPATIENT)
Dept: RADIOLOGY | Facility: MEDICAL CENTER | Age: 64
End: 2019-09-26
Attending: STUDENT IN AN ORGANIZED HEALTH CARE EDUCATION/TRAINING PROGRAM
Payer: MEDICARE

## 2019-09-26 ENCOUNTER — HOSPITAL ENCOUNTER (OUTPATIENT)
Facility: MEDICAL CENTER | Age: 64
End: 2019-09-28
Attending: EMERGENCY MEDICINE | Admitting: HOSPITALIST
Payer: MEDICARE

## 2019-09-26 DIAGNOSIS — J44.1 ACUTE EXACERBATION OF CHRONIC OBSTRUCTIVE PULMONARY DISEASE (COPD) (HCC): ICD-10-CM

## 2019-09-26 DIAGNOSIS — I10 POORLY-CONTROLLED HYPERTENSION: ICD-10-CM

## 2019-09-26 DIAGNOSIS — D64.9 ANEMIA, UNSPECIFIED TYPE: ICD-10-CM

## 2019-09-26 DIAGNOSIS — J44.1 COPD WITH EXACERBATION (HCC): ICD-10-CM

## 2019-09-26 DIAGNOSIS — R94.31 PROLONGED QT INTERVAL: ICD-10-CM

## 2019-09-26 LAB
ALBUMIN SERPL BCP-MCNC: 3.8 G/DL (ref 3.2–4.9)
ALBUMIN/GLOB SERPL: 0.9 G/DL
ALP SERPL-CCNC: 161 U/L (ref 30–99)
ALT SERPL-CCNC: 44 U/L (ref 2–50)
ANION GAP SERPL CALC-SCNC: 11 MMOL/L (ref 0–11.9)
AST SERPL-CCNC: 71 U/L (ref 12–45)
BASOPHILS # BLD AUTO: 0.3 % (ref 0–1.8)
BASOPHILS # BLD: 0.03 K/UL (ref 0–0.12)
BILIRUB SERPL-MCNC: 1.4 MG/DL (ref 0.1–1.5)
BUN SERPL-MCNC: 15 MG/DL (ref 8–22)
CALCIUM SERPL-MCNC: 8.8 MG/DL (ref 8.5–10.5)
CHLORIDE SERPL-SCNC: 101 MMOL/L (ref 96–112)
CO2 SERPL-SCNC: 24 MMOL/L (ref 20–33)
CREAT SERPL-MCNC: 0.83 MG/DL (ref 0.5–1.4)
EKG IMPRESSION: NORMAL
EOSINOPHIL # BLD AUTO: 0.11 K/UL (ref 0–0.51)
EOSINOPHIL NFR BLD: 1.2 % (ref 0–6.9)
ERYTHROCYTE [DISTWIDTH] IN BLOOD BY AUTOMATED COUNT: 54.2 FL (ref 35.9–50)
GLOBULIN SER CALC-MCNC: 4.1 G/DL (ref 1.9–3.5)
GLUCOSE SERPL-MCNC: 98 MG/DL (ref 65–99)
HCT VFR BLD AUTO: 37.6 % (ref 42–52)
HGB BLD-MCNC: 12.6 G/DL (ref 14–18)
IMM GRANULOCYTES # BLD AUTO: 0.03 K/UL (ref 0–0.11)
IMM GRANULOCYTES NFR BLD AUTO: 0.3 % (ref 0–0.9)
LACTATE BLD-SCNC: 1.8 MMOL/L (ref 0.5–2)
LIPASE SERPL-CCNC: 72 U/L (ref 11–82)
LYMPHOCYTES # BLD AUTO: 4.67 K/UL (ref 1–4.8)
LYMPHOCYTES NFR BLD: 49.1 % (ref 22–41)
MCH RBC QN AUTO: 34.6 PG (ref 27–33)
MCHC RBC AUTO-ENTMCNC: 33.5 G/DL (ref 33.7–35.3)
MCV RBC AUTO: 103.3 FL (ref 81.4–97.8)
MONOCYTES # BLD AUTO: 1.18 K/UL (ref 0–0.85)
MONOCYTES NFR BLD AUTO: 12.4 % (ref 0–13.4)
NEUTROPHILS # BLD AUTO: 3.49 K/UL (ref 1.82–7.42)
NEUTROPHILS NFR BLD: 36.7 % (ref 44–72)
NRBC # BLD AUTO: 0.02 K/UL
NRBC BLD-RTO: 0.2 /100 WBC
NT-PROBNP SERPL IA-MCNC: 5030 PG/ML (ref 0–125)
PLATELET # BLD AUTO: 174 K/UL (ref 164–446)
PMV BLD AUTO: 11.1 FL (ref 9–12.9)
POTASSIUM SERPL-SCNC: 3.3 MMOL/L (ref 3.6–5.5)
PROCALCITONIN SERPL-MCNC: <0.05 NG/ML
PROT SERPL-MCNC: 7.9 G/DL (ref 6–8.2)
RBC # BLD AUTO: 3.64 M/UL (ref 4.7–6.1)
SODIUM SERPL-SCNC: 136 MMOL/L (ref 135–145)
TROPONIN T SERPL-MCNC: 33 NG/L (ref 6–19)
WBC # BLD AUTO: 9.5 K/UL (ref 4.8–10.8)

## 2019-09-26 PROCEDURE — 82746 ASSAY OF FOLIC ACID SERUM: CPT

## 2019-09-26 PROCEDURE — 80053 COMPREHEN METABOLIC PANEL: CPT

## 2019-09-26 PROCEDURE — 99285 EMERGENCY DEPT VISIT HI MDM: CPT

## 2019-09-26 PROCEDURE — 82607 VITAMIN B-12: CPT

## 2019-09-26 PROCEDURE — 84145 PROCALCITONIN (PCT): CPT

## 2019-09-26 PROCEDURE — 83690 ASSAY OF LIPASE: CPT

## 2019-09-26 PROCEDURE — 83605 ASSAY OF LACTIC ACID: CPT

## 2019-09-26 PROCEDURE — 700101 HCHG RX REV CODE 250: Performed by: EMERGENCY MEDICINE

## 2019-09-26 PROCEDURE — 94760 N-INVAS EAR/PLS OXIMETRY 1: CPT

## 2019-09-26 PROCEDURE — 84484 ASSAY OF TROPONIN QUANT: CPT

## 2019-09-26 PROCEDURE — 85046 RETICYTE/HGB CONCENTRATE: CPT

## 2019-09-26 PROCEDURE — 93005 ELECTROCARDIOGRAM TRACING: CPT | Performed by: STUDENT IN AN ORGANIZED HEALTH CARE EDUCATION/TRAINING PROGRAM

## 2019-09-26 PROCEDURE — 71045 X-RAY EXAM CHEST 1 VIEW: CPT

## 2019-09-26 PROCEDURE — 82728 ASSAY OF FERRITIN: CPT

## 2019-09-26 PROCEDURE — 85025 COMPLETE CBC W/AUTO DIFF WBC: CPT

## 2019-09-26 PROCEDURE — 83880 ASSAY OF NATRIURETIC PEPTIDE: CPT

## 2019-09-26 PROCEDURE — 94640 AIRWAY INHALATION TREATMENT: CPT

## 2019-09-26 RX ORDER — IPRATROPIUM BROMIDE AND ALBUTEROL SULFATE 2.5; .5 MG/3ML; MG/3ML
3 SOLUTION RESPIRATORY (INHALATION) ONCE
Status: COMPLETED | OUTPATIENT
Start: 2019-09-26 | End: 2019-09-26

## 2019-09-26 RX ORDER — METHYLPREDNISOLONE SODIUM SUCCINATE 125 MG/2ML
125 INJECTION, POWDER, LYOPHILIZED, FOR SOLUTION INTRAMUSCULAR; INTRAVENOUS ONCE
Status: COMPLETED | OUTPATIENT
Start: 2019-09-27 | End: 2019-09-27

## 2019-09-26 RX ORDER — LISINOPRIL 10 MG/1
10 TABLET ORAL ONCE
Status: COMPLETED | OUTPATIENT
Start: 2019-09-27 | End: 2019-09-27

## 2019-09-26 RX ADMIN — IPRATROPIUM BROMIDE AND ALBUTEROL SULFATE 3 ML: .5; 3 SOLUTION RESPIRATORY (INHALATION) at 21:44

## 2019-09-26 ASSESSMENT — COPD QUESTIONNAIRES
COPD SCREENING SCORE: 7
HAVE YOU SMOKED AT LEAST 100 CIGARETTES IN YOUR ENTIRE LIFE: YES
DO YOU EVER COUGH UP ANY MUCUS OR PHLEGM?: YES, A FEW DAYS A WEEK OR MONTH
DURING THE PAST 4 WEEKS HOW MUCH DID YOU FEEL SHORT OF BREATH: SOME OF THE TIME

## 2019-09-26 ASSESSMENT — LIFESTYLE VARIABLES: EVER_SMOKED: YES

## 2019-09-27 PROBLEM — E87.6 HYPOKALEMIA: Status: ACTIVE | Noted: 2019-09-27

## 2019-09-27 PROBLEM — R07.9 PAIN IN THE CHEST: Status: ACTIVE | Noted: 2019-09-27

## 2019-09-27 PROBLEM — D64.9 ANEMIA: Status: ACTIVE | Noted: 2019-09-27

## 2019-09-27 LAB
EKG IMPRESSION: NORMAL
EKG IMPRESSION: NORMAL
FERRITIN SERPL-MCNC: 237 NG/ML (ref 22–322)
FOLATE SERPL-MCNC: 13 NG/ML
HGB RETIC QN AUTO: 37.4 PG/CELL (ref 29–35)
IMM RETICS NFR: 12.7 % (ref 9.3–17.4)
IRON SATN MFR SERPL: 57 % (ref 15–55)
IRON SERPL-MCNC: 233 UG/DL (ref 50–180)
MAGNESIUM SERPL-MCNC: 1.7 MG/DL (ref 1.5–2.5)
RETICS # AUTO: 0.13 M/UL (ref 0.04–0.06)
RETICS/RBC NFR: 3.5 % (ref 0.8–2.1)
TIBC SERPL-MCNC: 410 UG/DL (ref 250–450)
TROPONIN T SERPL-MCNC: 32 NG/L (ref 6–19)
VIT B12 SERPL-MCNC: 414 PG/ML (ref 211–911)

## 2019-09-27 PROCEDURE — G0378 HOSPITAL OBSERVATION PER HR: HCPCS

## 2019-09-27 PROCEDURE — 84484 ASSAY OF TROPONIN QUANT: CPT

## 2019-09-27 PROCEDURE — 94760 N-INVAS EAR/PLS OXIMETRY 1: CPT

## 2019-09-27 PROCEDURE — 700111 HCHG RX REV CODE 636 W/ 250 OVERRIDE (IP): Performed by: EMERGENCY MEDICINE

## 2019-09-27 PROCEDURE — 96372 THER/PROPH/DIAG INJ SC/IM: CPT

## 2019-09-27 PROCEDURE — 700111 HCHG RX REV CODE 636 W/ 250 OVERRIDE (IP): Performed by: HOSPITALIST

## 2019-09-27 PROCEDURE — 83540 ASSAY OF IRON: CPT

## 2019-09-27 PROCEDURE — 83550 IRON BINDING TEST: CPT

## 2019-09-27 PROCEDURE — 93005 ELECTROCARDIOGRAM TRACING: CPT | Mod: XE,77 | Performed by: HOSPITALIST

## 2019-09-27 PROCEDURE — 93010 ELECTROCARDIOGRAM REPORT: CPT | Performed by: INTERNAL MEDICINE

## 2019-09-27 PROCEDURE — 36415 COLL VENOUS BLD VENIPUNCTURE: CPT

## 2019-09-27 PROCEDURE — 94667 MNPJ CHEST WALL 1ST: CPT | Mod: XU

## 2019-09-27 PROCEDURE — 94640 AIRWAY INHALATION TREATMENT: CPT

## 2019-09-27 PROCEDURE — 700102 HCHG RX REV CODE 250 W/ 637 OVERRIDE(OP): Performed by: HOSPITALIST

## 2019-09-27 PROCEDURE — 99406 BEHAV CHNG SMOKING 3-10 MIN: CPT | Performed by: HOSPITALIST

## 2019-09-27 PROCEDURE — A9270 NON-COVERED ITEM OR SERVICE: HCPCS | Performed by: HOSPITALIST

## 2019-09-27 PROCEDURE — 700101 HCHG RX REV CODE 250: Performed by: HOSPITALIST

## 2019-09-27 PROCEDURE — 83735 ASSAY OF MAGNESIUM: CPT

## 2019-09-27 PROCEDURE — 96376 TX/PRO/DX INJ SAME DRUG ADON: CPT

## 2019-09-27 PROCEDURE — 700102 HCHG RX REV CODE 250 W/ 637 OVERRIDE(OP): Performed by: EMERGENCY MEDICINE

## 2019-09-27 PROCEDURE — 96375 TX/PRO/DX INJ NEW DRUG ADDON: CPT

## 2019-09-27 PROCEDURE — 96374 THER/PROPH/DIAG INJ IV PUSH: CPT

## 2019-09-27 PROCEDURE — 94668 MNPJ CHEST WALL SBSQ: CPT | Mod: XU

## 2019-09-27 PROCEDURE — 700111 HCHG RX REV CODE 636 W/ 250 OVERRIDE (IP): Performed by: INTERNAL MEDICINE

## 2019-09-27 PROCEDURE — 93005 ELECTROCARDIOGRAM TRACING: CPT | Performed by: HOSPITALIST

## 2019-09-27 PROCEDURE — A9270 NON-COVERED ITEM OR SERVICE: HCPCS | Performed by: EMERGENCY MEDICINE

## 2019-09-27 PROCEDURE — 99220 PR INITIAL OBSERVATION CARE,LEVL III: CPT | Mod: 25 | Performed by: HOSPITALIST

## 2019-09-27 RX ORDER — ASPIRIN 300 MG/1
300 SUPPOSITORY RECTAL DAILY
Status: DISCONTINUED | OUTPATIENT
Start: 2019-09-27 | End: 2019-09-27

## 2019-09-27 RX ORDER — ONDANSETRON 4 MG/1
4 TABLET, ORALLY DISINTEGRATING ORAL EVERY 4 HOURS PRN
Status: DISCONTINUED | OUTPATIENT
Start: 2019-09-27 | End: 2019-09-28 | Stop reason: HOSPADM

## 2019-09-27 RX ORDER — BUDESONIDE 0.25 MG/2ML
0.25 INHALANT ORAL
Status: DISCONTINUED | OUTPATIENT
Start: 2019-09-27 | End: 2019-09-28 | Stop reason: HOSPADM

## 2019-09-27 RX ORDER — BUPRENORPHINE HYDROCHLORIDE AND NALOXONE HYDROCHLORIDE DIHYDRATE 8; 2 MG/1; MG/1
1 TABLET SUBLINGUAL 2 TIMES DAILY
Status: DISCONTINUED | OUTPATIENT
Start: 2019-09-27 | End: 2019-09-27

## 2019-09-27 RX ORDER — ASPIRIN 81 MG/1
324 TABLET, CHEWABLE ORAL DAILY
Status: DISCONTINUED | OUTPATIENT
Start: 2019-09-27 | End: 2019-09-27

## 2019-09-27 RX ORDER — LORAZEPAM 2 MG/ML
1.5 INJECTION INTRAMUSCULAR
Status: DISCONTINUED | OUTPATIENT
Start: 2019-09-27 | End: 2019-09-28 | Stop reason: HOSPADM

## 2019-09-27 RX ORDER — BUPRENORPHINE HYDROCHLORIDE AND NALOXONE HYDROCHLORIDE DIHYDRATE 8; 2 MG/1; MG/1
1 TABLET SUBLINGUAL 2 TIMES DAILY
Status: DISCONTINUED | OUTPATIENT
Start: 2019-09-27 | End: 2019-09-28 | Stop reason: HOSPADM

## 2019-09-27 RX ORDER — IPRATROPIUM BROMIDE AND ALBUTEROL SULFATE 2.5; .5 MG/3ML; MG/3ML
3 SOLUTION RESPIRATORY (INHALATION)
Status: DISCONTINUED | OUTPATIENT
Start: 2019-09-27 | End: 2019-09-27

## 2019-09-27 RX ORDER — METHYLPREDNISOLONE SODIUM SUCCINATE 40 MG/ML
40 INJECTION, POWDER, LYOPHILIZED, FOR SOLUTION INTRAMUSCULAR; INTRAVENOUS EVERY 8 HOURS
Status: COMPLETED | OUTPATIENT
Start: 2019-09-27 | End: 2019-09-28

## 2019-09-27 RX ORDER — KETOROLAC TROMETHAMINE 30 MG/ML
30 INJECTION, SOLUTION INTRAMUSCULAR; INTRAVENOUS EVERY 6 HOURS PRN
Status: DISCONTINUED | OUTPATIENT
Start: 2019-09-27 | End: 2019-09-28 | Stop reason: HOSPADM

## 2019-09-27 RX ORDER — CLONIDINE HYDROCHLORIDE 0.1 MG/1
0.3 TABLET ORAL TWICE DAILY
Status: DISCONTINUED | OUTPATIENT
Start: 2019-09-27 | End: 2019-09-28 | Stop reason: HOSPADM

## 2019-09-27 RX ORDER — LORAZEPAM 2 MG/ML
1 INJECTION INTRAMUSCULAR
Status: DISCONTINUED | OUTPATIENT
Start: 2019-09-27 | End: 2019-09-28 | Stop reason: HOSPADM

## 2019-09-27 RX ORDER — LORAZEPAM 2 MG/1
2 TABLET ORAL
Status: DISCONTINUED | OUTPATIENT
Start: 2019-09-27 | End: 2019-09-28 | Stop reason: HOSPADM

## 2019-09-27 RX ORDER — BISACODYL 10 MG
10 SUPPOSITORY, RECTAL RECTAL
Status: DISCONTINUED | OUTPATIENT
Start: 2019-09-27 | End: 2019-09-28 | Stop reason: HOSPADM

## 2019-09-27 RX ORDER — LORAZEPAM 1 MG/1
1 TABLET ORAL EVERY 4 HOURS PRN
Status: DISCONTINUED | OUTPATIENT
Start: 2019-09-27 | End: 2019-09-28 | Stop reason: HOSPADM

## 2019-09-27 RX ORDER — PREDNISONE 20 MG/1
40 TABLET ORAL DAILY
Status: DISCONTINUED | OUTPATIENT
Start: 2019-09-28 | End: 2019-09-28 | Stop reason: HOSPADM

## 2019-09-27 RX ORDER — AMOXICILLIN 250 MG
2 CAPSULE ORAL 2 TIMES DAILY
Status: DISCONTINUED | OUTPATIENT
Start: 2019-09-27 | End: 2019-09-28 | Stop reason: HOSPADM

## 2019-09-27 RX ORDER — LORAZEPAM 2 MG/ML
0.5 INJECTION INTRAMUSCULAR EVERY 4 HOURS PRN
Status: DISCONTINUED | OUTPATIENT
Start: 2019-09-27 | End: 2019-09-28 | Stop reason: HOSPADM

## 2019-09-27 RX ORDER — HEPARIN SODIUM 5000 [USP'U]/ML
5000 INJECTION, SOLUTION INTRAVENOUS; SUBCUTANEOUS EVERY 8 HOURS
Status: DISCONTINUED | OUTPATIENT
Start: 2019-09-27 | End: 2019-09-28 | Stop reason: HOSPADM

## 2019-09-27 RX ORDER — LORAZEPAM 0.5 MG/1
0.5 TABLET ORAL EVERY 4 HOURS PRN
Status: DISCONTINUED | OUTPATIENT
Start: 2019-09-27 | End: 2019-09-28 | Stop reason: HOSPADM

## 2019-09-27 RX ORDER — POTASSIUM CHLORIDE 20 MEQ/1
40 TABLET, EXTENDED RELEASE ORAL ONCE
Status: COMPLETED | OUTPATIENT
Start: 2019-09-27 | End: 2019-09-27

## 2019-09-27 RX ORDER — LORAZEPAM 2 MG/1
4 TABLET ORAL
Status: DISCONTINUED | OUTPATIENT
Start: 2019-09-27 | End: 2019-09-28 | Stop reason: HOSPADM

## 2019-09-27 RX ORDER — PREDNISONE 20 MG/1
40 TABLET ORAL DAILY
Status: DISCONTINUED | OUTPATIENT
Start: 2019-09-28 | End: 2019-09-27

## 2019-09-27 RX ORDER — METHYLPREDNISOLONE SODIUM SUCCINATE 40 MG/ML
40 INJECTION, POWDER, LYOPHILIZED, FOR SOLUTION INTRAMUSCULAR; INTRAVENOUS EVERY 8 HOURS
Status: DISCONTINUED | OUTPATIENT
Start: 2019-09-27 | End: 2019-09-27

## 2019-09-27 RX ORDER — ASPIRIN 81 MG/1
81 TABLET, CHEWABLE ORAL DAILY
Status: DISCONTINUED | OUTPATIENT
Start: 2019-09-28 | End: 2019-09-28 | Stop reason: HOSPADM

## 2019-09-27 RX ORDER — NICOTINE 21 MG/24HR
14 PATCH, TRANSDERMAL 24 HOURS TRANSDERMAL
Status: DISCONTINUED | OUTPATIENT
Start: 2019-09-27 | End: 2019-09-28 | Stop reason: HOSPADM

## 2019-09-27 RX ORDER — LISINOPRIL 10 MG/1
10 TABLET ORAL DAILY
Status: DISCONTINUED | OUTPATIENT
Start: 2019-09-27 | End: 2019-09-28 | Stop reason: HOSPADM

## 2019-09-27 RX ORDER — ASPIRIN 325 MG
325 TABLET ORAL DAILY
Status: DISCONTINUED | OUTPATIENT
Start: 2019-09-27 | End: 2019-09-27

## 2019-09-27 RX ORDER — ONDANSETRON 2 MG/ML
4 INJECTION INTRAMUSCULAR; INTRAVENOUS EVERY 4 HOURS PRN
Status: DISCONTINUED | OUTPATIENT
Start: 2019-09-27 | End: 2019-09-28 | Stop reason: HOSPADM

## 2019-09-27 RX ORDER — LORAZEPAM 2 MG/ML
2 INJECTION INTRAMUSCULAR
Status: DISCONTINUED | OUTPATIENT
Start: 2019-09-27 | End: 2019-09-28 | Stop reason: HOSPADM

## 2019-09-27 RX ORDER — IPRATROPIUM BROMIDE AND ALBUTEROL SULFATE 2.5; .5 MG/3ML; MG/3ML
3 SOLUTION RESPIRATORY (INHALATION)
Status: DISCONTINUED | OUTPATIENT
Start: 2019-09-27 | End: 2019-09-28 | Stop reason: HOSPADM

## 2019-09-27 RX ORDER — POLYETHYLENE GLYCOL 3350 17 G/17G
1 POWDER, FOR SOLUTION ORAL
Status: DISCONTINUED | OUTPATIENT
Start: 2019-09-27 | End: 2019-09-28 | Stop reason: HOSPADM

## 2019-09-27 RX ADMIN — ASPIRIN 325 MG: 325 TABLET, FILM COATED ORAL at 05:57

## 2019-09-27 RX ADMIN — CLONIDINE HYDROCHLORIDE 0.3 MG: 0.1 TABLET ORAL at 03:21

## 2019-09-27 RX ADMIN — METHYLPREDNISOLONE SODIUM SUCCINATE 40 MG: 40 INJECTION, POWDER, FOR SOLUTION INTRAMUSCULAR; INTRAVENOUS at 10:23

## 2019-09-27 RX ADMIN — LORAZEPAM 2 MG: 2 TABLET ORAL at 17:09

## 2019-09-27 RX ADMIN — BUDESONIDE 0.25 MG: 0.25 SUSPENSION RESPIRATORY (INHALATION) at 20:35

## 2019-09-27 RX ADMIN — BUPRENORPHINE HYDROCHLORIDE AND NALOXONE HYDROCHLORIDE DIHYDRATE 1 TABLET: 8; 2 TABLET SUBLINGUAL at 17:09

## 2019-09-27 RX ADMIN — SENNOSIDES, DOCUSATE SODIUM 2 TABLET: 50; 8.6 TABLET, FILM COATED ORAL at 17:09

## 2019-09-27 RX ADMIN — POTASSIUM CHLORIDE 40 MEQ: 1500 TABLET, EXTENDED RELEASE ORAL at 02:51

## 2019-09-27 RX ADMIN — LORAZEPAM 1 MG: 1 TABLET ORAL at 05:57

## 2019-09-27 RX ADMIN — IPRATROPIUM BROMIDE AND ALBUTEROL SULFATE 3 ML: .5; 3 SOLUTION RESPIRATORY (INHALATION) at 20:35

## 2019-09-27 RX ADMIN — KETOROLAC TROMETHAMINE 30 MG: 30 INJECTION, SOLUTION INTRAMUSCULAR at 17:08

## 2019-09-27 RX ADMIN — IPRATROPIUM BROMIDE AND ALBUTEROL SULFATE 3 ML: .5; 3 SOLUTION RESPIRATORY (INHALATION) at 13:16

## 2019-09-27 RX ADMIN — NICOTINE 14 MG: 14 PATCH TRANSDERMAL at 05:56

## 2019-09-27 RX ADMIN — NICOTINE POLACRILEX 2 MG: 2 GUM, CHEWING BUCCAL at 17:09

## 2019-09-27 RX ADMIN — IPRATROPIUM BROMIDE AND ALBUTEROL SULFATE 3 ML: .5; 3 SOLUTION RESPIRATORY (INHALATION) at 22:32

## 2019-09-27 RX ADMIN — LORAZEPAM 1 MG: 1 TABLET ORAL at 03:28

## 2019-09-27 RX ADMIN — SENNOSIDES, DOCUSATE SODIUM 2 TABLET: 50; 8.6 TABLET, FILM COATED ORAL at 05:57

## 2019-09-27 RX ADMIN — HEPARIN SODIUM 5000 UNITS: 5000 INJECTION INTRAVENOUS; SUBCUTANEOUS at 05:56

## 2019-09-27 RX ADMIN — LORAZEPAM 2 MG: 2 TABLET ORAL at 14:14

## 2019-09-27 RX ADMIN — IPRATROPIUM BROMIDE AND ALBUTEROL SULFATE 3 ML: .5; 3 SOLUTION RESPIRATORY (INHALATION) at 08:32

## 2019-09-27 RX ADMIN — HEPARIN SODIUM 5000 UNITS: 5000 INJECTION INTRAVENOUS; SUBCUTANEOUS at 14:15

## 2019-09-27 RX ADMIN — BUDESONIDE 0.25 MG: 0.25 SUSPENSION RESPIRATORY (INHALATION) at 08:31

## 2019-09-27 RX ADMIN — LISINOPRIL 10 MG: 10 TABLET ORAL at 02:51

## 2019-09-27 RX ADMIN — METHYLPREDNISOLONE SODIUM SUCCINATE 40 MG: 40 INJECTION, POWDER, FOR SOLUTION INTRAMUSCULAR; INTRAVENOUS at 17:08

## 2019-09-27 RX ADMIN — NICOTINE POLACRILEX 2 MG: 2 GUM, CHEWING BUCCAL at 03:21

## 2019-09-27 RX ADMIN — METHYLPREDNISOLONE SODIUM SUCCINATE 125 MG: 125 INJECTION, POWDER, FOR SOLUTION INTRAMUSCULAR; INTRAVENOUS at 02:52

## 2019-09-27 RX ADMIN — LORAZEPAM 3 MG: 1 TABLET ORAL at 21:48

## 2019-09-27 RX ADMIN — CLONIDINE HYDROCHLORIDE 0.3 MG: 0.1 TABLET ORAL at 17:08

## 2019-09-27 RX ADMIN — BUPRENORPHINE HYDROCHLORIDE AND NALOXONE HYDROCHLORIDE DIHYDRATE 1 TABLET: 8; 2 TABLET SUBLINGUAL at 02:04

## 2019-09-27 RX ADMIN — HEPARIN SODIUM 5000 UNITS: 5000 INJECTION INTRAVENOUS; SUBCUTANEOUS at 21:48

## 2019-09-27 ASSESSMENT — LIFESTYLE VARIABLES
AGITATION: SOMEWHAT MORE THAN NORMAL ACTIVITY
NAUSEA AND VOMITING: MILD NAUSEA WITH NO VOMITING
VISUAL DISTURBANCES: NOT PRESENT
NAUSEA AND VOMITING: MILD NAUSEA WITH NO VOMITING
AGITATION: SOMEWHAT MORE THAN NORMAL ACTIVITY
TOTAL SCORE: 2
HEADACHE, FULLNESS IN HEAD: VERY MILD
ANXIETY: MILDLY ANXIOUS
ANXIETY: NO ANXIETY (AT EASE)
HEADACHE, FULLNESS IN HEAD: VERY MILD
PAROXYSMAL SWEATS: NO SWEAT VISIBLE
SUBSTANCE_ABUSE: 0
EVER_SMOKED: YES
TOTAL SCORE: 11
HEADACHE, FULLNESS IN HEAD: NOT PRESENT
HAVE PEOPLE ANNOYED YOU BY CRITICIZING YOUR DRINKING: YES
ANXIETY: MODERATELY ANXIOUS OR GUARDED, SO ANXIETY IS INFERRED
HEADACHE, FULLNESS IN HEAD: MILD
ANXIETY: MODERATELY ANXIOUS OR GUARDED, SO ANXIETY IS INFERRED
TOTAL SCORE: 2
HEADACHE, FULLNESS IN HEAD: VERY MILD
TREMOR: TREMOR NOT VISIBLE BUT CAN BE FELT, FINGERTIP TO FINGERTIP
ORIENTATION AND CLOUDING OF SENSORIUM: ORIENTED AND CAN DO SERIAL ADDITIONS
HEADACHE, FULLNESS IN HEAD: VERY MILD
ORIENTATION AND CLOUDING OF SENSORIUM: ORIENTED AND CAN DO SERIAL ADDITIONS
VISUAL DISTURBANCES: NOT PRESENT
ALCOHOL_USE: YES
ORIENTATION AND CLOUDING OF SENSORIUM: ORIENTED AND CAN DO SERIAL ADDITIONS
TOTAL SCORE: 3
NAUSEA AND VOMITING: NO NAUSEA AND NO VOMITING
ORIENTATION AND CLOUDING OF SENSORIUM: ORIENTED AND CAN DO SERIAL ADDITIONS
ANXIETY: MILDLY ANXIOUS
HOW MANY TIMES IN THE PAST YEAR HAVE YOU HAD 5 OR MORE DRINKS IN A DAY: 300
ANXIETY: MODERATELY ANXIOUS OR GUARDED, SO ANXIETY IS INFERRED
AUDITORY DISTURBANCES: NOT PRESENT
AUDITORY DISTURBANCES: NOT PRESENT
PAROXYSMAL SWEATS: BARELY PERCEPTIBLE SWEATING. PALMS MOIST
TREMOR: NO TREMOR
ORIENTATION AND CLOUDING OF SENSORIUM: ORIENTED AND CAN DO SERIAL ADDITIONS
ANXIETY: *
TREMOR: *
ORIENTATION AND CLOUDING OF SENSORIUM: ORIENTED AND CAN DO SERIAL ADDITIONS
HEADACHE, FULLNESS IN HEAD: MILD
PAROXYSMAL SWEATS: BARELY PERCEPTIBLE SWEATING. PALMS MOIST
NAUSEA AND VOMITING: NO NAUSEA AND NO VOMITING
NAUSEA AND VOMITING: NO NAUSEA AND NO VOMITING
AGITATION: NORMAL ACTIVITY
ANXIETY: MILDLY ANXIOUS
AGITATION: SOMEWHAT MORE THAN NORMAL ACTIVITY
EVER HAD A DRINK FIRST THING IN THE MORNING TO STEADY YOUR NERVES TO GET RID OF A HANGOVER: YES
VISUAL DISTURBANCES: NOT PRESENT
VISUAL DISTURBANCES: MODERATE SENSITIVITY
ORIENTATION AND CLOUDING OF SENSORIUM: ORIENTED AND CAN DO SERIAL ADDITIONS
ORIENTATION AND CLOUDING OF SENSORIUM: ORIENTED AND CAN DO SERIAL ADDITIONS
PAROXYSMAL SWEATS: BARELY PERCEPTIBLE SWEATING. PALMS MOIST
HEADACHE, FULLNESS IN HEAD: NOT PRESENT
PAROXYSMAL SWEATS: BARELY PERCEPTIBLE SWEATING. PALMS MOIST
TOTAL SCORE: 10
AGITATION: NORMAL ACTIVITY
DOES PATIENT WANT TO TALK TO SOMEONE ABOUT QUITTING: YES
AUDITORY DISTURBANCES: NOT PRESENT
TOTAL SCORE: 3
ANXIETY: NO ANXIETY (AT EASE)
EVER FELT BAD OR GUILTY ABOUT YOUR DRINKING: NO
AGITATION: SOMEWHAT MORE THAN NORMAL ACTIVITY
CONSUMPTION TOTAL: POSITIVE
TOTAL SCORE: 3
TREMOR: *
AGITATION: NORMAL ACTIVITY
TREMOR: *
DOES PATIENT WANT TO STOP DRINKING: YES
PAROXYSMAL SWEATS: *
VISUAL DISTURBANCES: NOT PRESENT
HEADACHE, FULLNESS IN HEAD: MODERATE
TREMOR: *
VISUAL DISTURBANCES: NOT PRESENT
AUDITORY DISTURBANCES: NOT PRESENT
NAUSEA AND VOMITING: MILD NAUSEA WITH NO VOMITING
AUDITORY DISTURBANCES: NOT PRESENT
PAROXYSMAL SWEATS: NO SWEAT VISIBLE
AUDITORY DISTURBANCES: VERY MILD HARSHNESS OR ABILITY TO FRIGHTEN
PAROXYSMAL SWEATS: *
HAVE YOU EVER FELT YOU SHOULD CUT DOWN ON YOUR DRINKING: YES
PAROXYSMAL SWEATS: BARELY PERCEPTIBLE SWEATING. PALMS MOIST
AUDITORY DISTURBANCES: NOT PRESENT
TOTAL SCORE: 1
TOTAL SCORE: 11
NAUSEA AND VOMITING: NO NAUSEA AND NO VOMITING
TREMOR: NO TREMOR
TREMOR: NO TREMOR
ORIENTATION AND CLOUDING OF SENSORIUM: ORIENTED AND CAN DO SERIAL ADDITIONS
AUDITORY DISTURBANCES: NOT PRESENT
TOTAL SCORE: 10
NAUSEA AND VOMITING: MILD NAUSEA WITH NO VOMITING
NAUSEA AND VOMITING: NO NAUSEA AND NO VOMITING
TOTAL SCORE: 2
TOTAL SCORE: VERY MILD ITCHING, PINS AND NEEDLES SENSATION, BURNING OR NUMBNESS
AVERAGE NUMBER OF DAYS PER WEEK YOU HAVE A DRINK CONTAINING ALCOHOL: 7
VISUAL DISTURBANCES: NOT PRESENT
TOTAL SCORE: 19
VISUAL DISTURBANCES: NOT PRESENT
TREMOR: MODERATE TREMOR WITH ARMS EXTENDED
AUDITORY DISTURBANCES: NOT PRESENT
AGITATION: *
VISUAL DISTURBANCES: NOT PRESENT
AGITATION: NORMAL ACTIVITY
ON A TYPICAL DAY WHEN YOU DRINK ALCOHOL HOW MANY DRINKS DO YOU HAVE: 4

## 2019-09-27 ASSESSMENT — ENCOUNTER SYMPTOMS
FLANK PAIN: 0
SPEECH CHANGE: 0
BLURRED VISION: 0
PALPITATIONS: 0
COUGH: 1
EYE DISCHARGE: 0
ABDOMINAL PAIN: 0
FOCAL WEAKNESS: 0
NAUSEA: 0
DEPRESSION: 0
HALLUCINATIONS: 0
CHILLS: 0
FEVER: 0
HEMOPTYSIS: 0
SENSORY CHANGE: 0
VOMITING: 0
BRUISES/BLEEDS EASILY: 0
HEARTBURN: 0
WEAKNESS: 0
DOUBLE VISION: 0
DIZZINESS: 0
SHORTNESS OF BREATH: 1
MYALGIAS: 0

## 2019-09-27 ASSESSMENT — PATIENT HEALTH QUESTIONNAIRE - PHQ9
1. LITTLE INTEREST OR PLEASURE IN DOING THINGS: SEVERAL DAYS
6. FEELING BAD ABOUT YOURSELF - OR THAT YOU ARE A FAILURE OR HAVE LET YOURSELF OR YOUR FAMILY DOWN: SEVERAL DAYS
9. THOUGHTS THAT YOU WOULD BE BETTER OFF DEAD, OR OF HURTING YOURSELF: NOT AT ALL
2. FEELING DOWN, DEPRESSED, IRRITABLE, OR HOPELESS: SEVERAL DAYS
3. TROUBLE FALLING OR STAYING ASLEEP OR SLEEPING TOO MUCH: SEVERAL DAYS
7. TROUBLE CONCENTRATING ON THINGS, SUCH AS READING THE NEWSPAPER OR WATCHING TELEVISION: SEVERAL DAYS
8. MOVING OR SPEAKING SO SLOWLY THAT OTHER PEOPLE COULD HAVE NOTICED. OR THE OPPOSITE, BEING SO FIGETY OR RESTLESS THAT YOU HAVE BEEN MOVING AROUND A LOT MORE THAN USUAL: SEVERAL DAYS
SUM OF ALL RESPONSES TO PHQ9 QUESTIONS 1 AND 2: 2
5. POOR APPETITE OR OVEREATING: SEVERAL DAYS
4. FEELING TIRED OR HAVING LITTLE ENERGY: SEVERAL DAYS
SUM OF ALL RESPONSES TO PHQ QUESTIONS 1-9: 8

## 2019-09-27 ASSESSMENT — COGNITIVE AND FUNCTIONAL STATUS - GENERAL
WALKING IN HOSPITAL ROOM: A LITTLE
MOBILITY SCORE: 20
STANDING UP FROM CHAIR USING ARMS: A LITTLE
MOVING FROM LYING ON BACK TO SITTING ON SIDE OF FLAT BED: A LITTLE
CLIMB 3 TO 5 STEPS WITH RAILING: A LITTLE
SUGGESTED CMS G CODE MODIFIER MOBILITY: CJ
DAILY ACTIVITIY SCORE: 24
SUGGESTED CMS G CODE MODIFIER DAILY ACTIVITY: CH

## 2019-09-27 NOTE — H&P
Hospital Medicine History & Physical Note    Date of Service  9/27/2019    Primary Care Physician  Pcp Pt States None    Consultants  none    Code Status  full    Chief Complaint  Shortness of breath, cough     History of Presenting Illness  64 y.o. male who presented 9/26/2019 with past medical history of alcohol abuse, hypertension, COPD who presents with shortness of breath.  This patient had shortness of breath associated cough that is been going on for the last several weeks.  He does have some mild chest pressure.  However this chest pressure is unchanged from his previous visit.  He is also had a cough productive of yellow sputum.  He feels like he has had pneumonia.  He has had shortness of breath especially with exertion.  He was recently discharged from the hospital on 9/22/2019.  At that time he had significant work-up.  He had an echocardiogram, right upper quadrant ultrasound, CTA of pulmonary arteries all of which were unremarkable.  He was found to have COPD exacerbation discharged home at that time.  He presents today with similar symptoms will be admitted to the hospital for COPD exacerbation.    Review of Systems  Review of Systems   Constitutional: Positive for malaise/fatigue. Negative for chills and fever.   HENT: Negative for congestion, hearing loss and tinnitus.    Eyes: Negative for blurred vision, double vision and discharge.   Respiratory: Positive for cough and shortness of breath. Negative for hemoptysis.    Cardiovascular: Negative for chest pain, palpitations and leg swelling.   Gastrointestinal: Negative for abdominal pain, heartburn, nausea and vomiting.   Genitourinary: Negative for dysuria and flank pain.   Musculoskeletal: Negative for joint pain and myalgias.   Skin: Negative for rash.   Neurological: Negative for dizziness, sensory change, speech change, focal weakness and weakness.   Endo/Heme/Allergies: Negative for environmental allergies. Does not bruise/bleed easily.    Psychiatric/Behavioral: Negative for depression, hallucinations and substance abuse.       Past Medical History   has a past medical history of Chronic obstructive pulmonary disease (HCC), ETOH abuse, and Hypertension.    Surgical History  Reviewed and not pertinent     Family History  Reviewed and not pertinent      Social History   reports that he has been smoking cigarettes. He has been smoking about 0.50 packs per day. He has never used smokeless tobacco. He reports that he drinks about 2.4 oz of alcohol per week. He reports that he does not use drugs.    Allergies  No Known Allergies    Medications  Prior to Admission Medications   Prescriptions Last Dose Informant Patient Reported? Taking?   B Complex Vitamins (VITAMIN B COMPLEX PO) 9/12/2019 at Unknown time Patient Yes No   Sig: Take 1 Tab by mouth every day.   Multiple Vitamin (MULTI VITAMIN PO) 9/12/2019 at Unknown time Patient Yes No   Sig: Take 1 Tab by mouth every day.   albuterol 108 (90 Base) MCG/ACT Aero Soln inhalation aerosol 9/26/2019 at 1900  No No   Sig: Inhale 2 Puffs by mouth every four hours as needed.   budesonide-formoterol (SYMBICORT) 80-4.5 MCG/ACT Aerosol 9/12/2019 at Unknown time  No No   Sig: Inhale 2 Puffs by mouth 2 Times a Day.   buprenorphine-naloxone (SUBOXONE) 8-2 MG SL Tab 9/26/2019 at 0630 Patient Yes No   Sig: Place 1 Tab under tongue 2 Times a Day.   cloNIDine (CATAPRESS) 0.3 MG Tab 9/12/2019 at Unknown time Patient Yes No   Sig: Take 0.3 mg by mouth 2 times a day.   gabapentin (NEURONTIN) 600 MG tablet 9/12/2019 at Unknown time Patient Yes No   Sig: Take 600 mg by mouth 3 times a day.   lisinopril (PRINIVIL) 10 MG Tab 9/12/2019 at Unknown time Patient Yes No   Sig: Take 10 mg by mouth every day.      Facility-Administered Medications: None       Physical Exam  Temp:  [37.1 °C (98.8 °F)] 37.1 °C (98.8 °F)  Pulse:  [72-86] 74  Resp:  [16-18] 16  BP: ()/() 171/107  SpO2:  [92 %-96 %] 96 %    Physical Exam    Constitutional: He is oriented to person, place, and time. He appears well-developed and well-nourished. He appears distressed.   HENT:   Head: Normocephalic and atraumatic.   Eyes: Pupils are equal, round, and reactive to light. Conjunctivae and EOM are normal.   Neck: Normal range of motion. Neck supple. No JVD present.   Cardiovascular: Normal rate, regular rhythm, normal heart sounds and intact distal pulses.   No murmur heard.  Pulmonary/Chest: Effort normal. No respiratory distress. He has wheezes. He exhibits no tenderness.   Abdominal: Soft. Bowel sounds are normal. He exhibits no distension. There is no tenderness.   Musculoskeletal: Normal range of motion. He exhibits no edema.   Neurological: He is alert and oriented to person, place, and time. No cranial nerve deficit. He exhibits normal muscle tone.   Skin: Skin is warm and dry. No erythema.   Psychiatric: He has a normal mood and affect. His behavior is normal. Judgment and thought content normal.   Nursing note and vitals reviewed.      Laboratory:  Recent Labs     09/26/19 2126   WBC 9.5   RBC 3.64*   HEMOGLOBIN 12.6*   HEMATOCRIT 37.6*   .3*   MCH 34.6*   MCHC 33.5*   RDW 54.2*   PLATELETCT 174   MPV 11.1     Recent Labs     09/26/19 2126   SODIUM 136   POTASSIUM 3.3*   CHLORIDE 101   CO2 24   GLUCOSE 98   BUN 15   CREATININE 0.83   CALCIUM 8.8     Recent Labs     09/26/19 2126   ALTSGPT 44   ASTSGOT 71*   ALKPHOSPHAT 161*   TBILIRUBIN 1.4   LIPASE 72   GLUCOSE 98         Recent Labs     09/26/19 2126   NTPROBNP 5030*         Recent Labs     09/26/19 2126   TROPONINT 33*       Urinalysis:    No results found     Imaging:  DX-CHEST-LIMITED (1 VIEW)   Final Result      No acute cardiopulmonary abnormality. No change.               INTERPRETING LOCATION: [address] [place] [address], [id number]            Assessment/Plan:  I anticipate this patient is appropriate for observation status at this time.    * COPD with exacerbation (HCC)- (present on  admission)  Assessment & Plan  Suspect acute COPD exacerbation with assocaited shortness of breath   Due to inability to afford home medciations, he need social work consultation   Cont with IV solumedrol followed by PO prednisone  Duo nebs   Pulmicort  Smoking cessation counseling     Transaminitis- (present on admission)  Assessment & Plan  This is chronic, ZEPEDA noted on last RUQ ultarsound     Hypokalemia  Assessment & Plan  Replace and recheck labs    Anemia  Assessment & Plan  Lab workup ordered    Pain in the chest  Assessment & Plan  This is associated with elevated troponin and EKG with out acute changes  He had extensive workup last admission including echo which was unremarkbale   Will cont with serial troponins   Asa for now  He may need a cardiac stress test as his Dyspnea is exertional, and his shortness of breath could be related to angina rather than COPD. But we will treat COPD overnight and reasses in the am.     Homelessness- (present on admission)  Assessment & Plan  Needs social work input for medications    Chronic hepatitis C without hepatic coma (HCC)- (present on admission)  Assessment & Plan  Hx of untreated    History of heroin abuse- (present on admission)  Assessment & Plan  On saboxone     Tobacco abuse- (present on admission)  Assessment & Plan  Greater than 3 minutes spent with the patient smoking cessation counseling. Discussed cardiovascular risk factors of smoking. Nicotine patch and gum ordered. He has no desire to quit at this time.     Alcohol dependence with unspecified alcohol-induced disorder (HCC)- (present on admission)  Assessment & Plan  Monitor for withdrawals, states he only drinks 2 beers a day. Encouraged cessation       VTE prophylaxis: heparin

## 2019-09-27 NOTE — DISCHARGE PLANNING
Anticipated Discharge Disposition: TBD    Action: LSW met with Pt at bedside to complete assessment and provide community resources.  Pt is currently on Suboxone and wanted providers in the [place] area.   Pt reported [facility] is [facility] [place]  Pt reported he receives $1000 a month from SSI/Disability.  Pt reported he came to [place] to be with is godchildren.  Pt went on to report since being in [place], he has not been able to contact his god children.  Pt reported he would like to leave [place], but did not know where to go.  LSW provided Pt with the following resources:  Affordable Housing  Chemical Dependency-Suboxone related treatment  Senior Services  Behavioral Health  Community Resources for food [place], job placement, homeless shelters, clothing donations, etc...      Barriers to Discharge: Medical clearance    Plan: LSW will continue to follow and assist as needed

## 2019-09-27 NOTE — CARE PLAN
Patient is alert and oriented. Call light is with-in reach. Non-skid foot wear is on. Bed alarm is on. Patient calls appropriately.

## 2019-09-27 NOTE — ED NOTES
Pt resting, in no acute distress, requesting sandwich, ERP made aware, hold food until cleared to eat at this time.

## 2019-09-27 NOTE — FLOWSHEET NOTE
Res     09/27/19 7297   Education   Education Yes - Pt. / Family has been Instructed in use of Respiratory Medications and Adverse Reactions   RT Assessment of Delivered Medications   Evaluation of Medication Delivery Daily Yes-- Pt /Family has been Instructed in use of Respiratory Medications and Adverse Reactions   SVN Group   #SVN Performed Yes   Given By: Mouthpiece   PEP/CPT Group   PEP/CPT/Airway Clearance Therapy Yes   #PEP/CPT (Manual) Subsequent Subsequent   PEP/CPT Method Flutter Valve   Chest Exam   Respiration 18   Pulse 60   Breath Sounds   Pre/Post Intervention Pre Intervention Assessment   RUL Breath Sounds Diminished   RML Breath Sounds Diminished   RLL Breath Sounds Diminished   MARY Breath Sounds Diminished   LLL Breath Sounds Diminished   Oxygen   O2 (LPM) 0

## 2019-09-27 NOTE — PROGRESS NOTES
2 RN Skin Check    2 RN skin check complete.   Devices in place tele box.  Skin assessed under devices yes.  Confirmed pressure ulcers found on NA.  New potential pressure ulcers noted on NA. Wound consult placed NA.  The following interventions in place pillows in place for positioning and draw sheet in place for repositioning. Pt is able to turn from side to side independently    Dried scabs/skin tears to bilateral elbows  All other skin is dry and intact

## 2019-09-27 NOTE — ED TRIAGE NOTES
"[facility]IB [facility] to triage  Chief Complaint   Patient presents with   • Shortness of Breath   • Cough     Pt was diagnosed with pneumonia 5 days ago, was given a Rx for abx but has not filled them, pt said he is not from here and does not know where the pharmacies are.  RA sat 96%, states \"it's harder to breath.\"  "

## 2019-09-27 NOTE — RESPIRATORY CARE
COPD EDUCATION by COPD CLINICAL EDUCATOR  9/27/2019 at 1:26 PM by Carley Gan     Patient interviewed by COPD education team. Patient refused COPD program at this time.

## 2019-09-27 NOTE — ED NOTES
"Pt ambulated to [facility] from triage, reports recent diagnosis of pneumonia here, unable to fill prescriptions, states, \"I'm not from here, I don't know where anything is.\" Pt speaking in full clear sentences, slight cough and coarse lung sounds noted. Chart up to be seen.  "

## 2019-09-27 NOTE — PROGRESS NOTES
Pt transported to floor, tele box placed and monitor room notified. Bedside report received from transporting nurse. Pt is alert with baseline level of chest heaviness and lower back pain, no intervention requested at this time. No signs or symptoms of resp distress noted on RA, pt does report that it feels heavy when he breaths.Bed  In low and locked position, call button within reach and fall precautions are in place    2030 Monitor room reports that pt is fluctuating in and out of junctional rhythm, per notes pt was SR in ED. STAT EKG ordered and Dr Lizama notified. New order obtained for STAT Mag level. RT present at bedside and Dr Lizama notified that RT recommends TX q4 hours and new orders obtained    0300 Hospitalist notified of pt's request of home med clonidine, high BP and ETOH history. New orders obtained

## 2019-09-27 NOTE — ED PROVIDER NOTES
"ED Provider Note    Scribed for Birdie Martinez D.O. by Rip Al. 9/26/2019, 8:42 PM.    Primary care provider: Pcp Pt States None  Means of arrival: EMS  History obtained from: Patient  History limited by: None    CHIEF COMPLAINT  Chief Complaint   Patient presents with   • Shortness of Breath   • Cough       HPI  Al Doan is a 64 y.o. male who presents to the Emergency Department for evaluation of chest pain onset today with shortness of breath that has been ongoing for the past couple of weeks. The patient reports of chest pain, but denies the pain radiating to his back or bilateral arms. The patient states that he can't catch his breath and \"that this is the worse chest pain and breathing problems I have ever had.\"  He reports of a productive cough with yellow sputum, nausea, and chills at night, but denies any fever, abdominal pain or vomiting.  The patient was seen in the ED 6 days ago for the same symptoms, which he was given antibiotics and an inhaler. He states that he has had reoccurring pneumonia for the past year. The patient does not have a history of IVF. He reports moving to [place] from [place] couple weeks ago. He has been smoking since the age of 11. He denies any IV drug use. The patient has had a previous history of alcohol withdrawals with no seizures. The patient denies any allergies. The patient is homeless.     REVIEW OF SYSTEMS  See HPI for further details. All other systems are negative.     PAST MEDICAL HISTORY   has a past medical history of Chronic obstructive pulmonary disease (HCC), ETOH abuse, and Hypertension.    SURGICAL HISTORY  patient denies any surgical history    SOCIAL HISTORY  Social History     Tobacco Use   • Smoking status: Current Every Day Smoker     Packs/day: 0.50     Types: Cigarettes   • Smokeless tobacco: Never Used   Substance Use Topics   • Alcohol use: Yes     Alcohol/week: 2.4 oz     Types: 4 Cans of beer per week     Frequency: 4 or more times a week     " Drinks per session: 3 or 4     Binge frequency: Weekly   • Drug use: Never      Social History     Substance and Sexual Activity   Drug Use Never       FAMILY HISTORY  None pertinent.    CURRENT MEDICATIONS  Reviewed.  See Encounter Summary.     ALLERGIES  No Known Allergies    PHYSICAL EXAM  VITAL SIGNS: BP (!) 96/66   Pulse 84   Temp 37.1 °C (98.8 °F) (Temporal)   Resp 18   Wt 64.5 kg (142 lb 3.2 oz)   SpO2 96%   BMI 22.27 kg/m²   Constitutional: Alert and in no apparent distress.  HENT: Normocephalic atraumatic. Bilateral external ears normal. Nose normal. Mucous membranes are moist.  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 Conjunctiva normal. Non-icteric sclera.   Neck: Normal range of motion without tenderness. Supple. No meningeal signs.  Cardiovascular: Regular rate and rhythm. No murmurs, gallops or rubs.  Thorax & Lungs: Coarse breath sounds throughout bilateral lung fields with some scant wheezing. No rhonchi or rales.  Abdomen: Soft, nontender and nondistended. No peritoneal signs noted.  Skin: Well-healed scar on right side of upper thigh. Warm and dry. No rashes are noted.  Back: No bony tenderness, No CVA tenderness.   Extremities: 2+ peripheral pulses. Cap refill is less than 2 seconds. No edema, cyanosis, or clubbing.  Musculoskeletal: Good range of motion in all major joints. No tenderness to palpation or major deformities noted.   Neurologic: Alert and oriented ×3. The patient moves all 4 extremities and follows commands.  Psychiatric: Affect is normal. Judgment appears to be intact.    DIAGNOSTIC STUDIES / PROCEDURES     LABS  Results for orders placed or performed during the hospital encounter of 09/26/19   proBrain Natriuretic Peptide, NT   Result Value Ref Range    NT-proBNP 5030 (H) 0 - 125 pg/mL   TROPONIN   Result Value Ref Range    Troponin T 33 (H) 6 - 19 ng/L   CBC WITH DIFFERENTIAL   Result Value Ref Range    WBC 9.5 4.8 - 10.8 K/uL    RBC 3.64  (L) 4.70 - 6.10 M/uL    Hemoglobin 12.6 (L) 14.0 - 18.0 g/dL    Hematocrit 37.6 (L) 42.0 - 52.0 %    .3 (H) 81.4 - 97.8 fL    MCH 34.6 (H) 27.0 - 33.0 pg    MCHC 33.5 (L) 33.7 - 35.3 g/dL    RDW 54.2 (H) 35.9 - 50.0 fL    Platelet Count 174 164 - 446 K/uL    MPV 11.1 9.0 - 12.9 fL    Neutrophils-Polys 36.70 (L) 44.00 - 72.00 %    Lymphocytes 49.10 (H) 22.00 - 41.00 %    Monocytes 12.40 0.00 - 13.40 %    Eosinophils 1.20 0.00 - 6.90 %    Basophils 0.30 0.00 - 1.80 %    Immature Granulocytes 0.30 0.00 - 0.90 %    Nucleated RBC 0.20 /100 WBC    Neutrophils (Absolute) 3.49 1.82 - 7.42 K/uL    Lymphs (Absolute) 4.67 1.00 - 4.80 K/uL    Monos (Absolute) 1.18 (H) 0.00 - 0.85 K/uL    Eos (Absolute) 0.11 0.00 - 0.51 K/uL    Baso (Absolute) 0.03 0.00 - 0.12 K/uL    Immature Granulocytes (abs) 0.03 0.00 - 0.11 K/uL    NRBC (Absolute) 0.02 K/uL   COMP METABOLIC PANEL   Result Value Ref Range    Sodium 136 135 - 145 mmol/L    Potassium 3.3 (L) 3.6 - 5.5 mmol/L    Chloride 101 96 - 112 mmol/L    Co2 24 20 - 33 mmol/L    Anion Gap 11.0 0.0 - 11.9    Glucose 98 65 - 99 mg/dL    Bun 15 8 - 22 mg/dL    Creatinine 0.83 0.50 - 1.40 mg/dL    Calcium 8.8 8.5 - 10.5 mg/dL    AST(SGOT) 71 (H) 12 - 45 U/L    ALT(SGPT) 44 2 - 50 U/L    Alkaline Phosphatase 161 (H) 30 - 99 U/L    Total Bilirubin 1.4 0.1 - 1.5 mg/dL    Albumin 3.8 3.2 - 4.9 g/dL    Total Protein 7.9 6.0 - 8.2 g/dL    Globulin 4.1 (H) 1.9 - 3.5 g/dL    A-G Ratio 0.9 g/dL   LIPASE   Result Value Ref Range    Lipase 72 11 - 82 U/L   ESTIMATED GFR   Result Value Ref Range    GFR If African American >60 >60 mL/min/1.73 m 2    GFR If Non African American >60 >60 mL/min/1.73 m 2   LACTIC ACID   Result Value Ref Range    Lactic Acid 1.8 0.5 - 2.0 mmol/L   PROCALCITONIN   Result Value Ref Range    Procalcitonin <0.05 <0.25 ng/mL   EKG   Result Value Ref Range    Report       [facility] Emergency Dept.    Test Date:  2019-09-26  Pt Name:    PAULINA MENDOZA                Department: ER  MRN:        [id number]                      Room:       [facility]  Gender:     Male                         Technician: [id number]  :        1955                   Requested By:SOBEIDA SAN  Order #:    [id number]                    Reading MD: Birdie Martinez    Measurements  Intervals                                Axis  Rate:       76                           P:          65  FL:         168                          QRS:        27  QRSD:       102                          T:          97  QT:         464  QTc:        522    Interpretive Statements  SINUS ARRHYTHMIA, RATE  61- 92  LVH WITH SECONDARY REPOLARIZATION ABNORMALITY  PROLONGED QT INTERVAL  Compared to ECG 2019 07:02:08  Sinus rhythm no longer present    Electronically Signed On 2019 23:52:28 PDT by Birdie Martinez         All labs were reviewed by me.    EKG  Interpreted by me as shown above.    RADIOLOGY  DX-CHEST-LIMITED (1 VIEW)   Final Result      No acute cardiopulmonary abnormality. No change.               INTERPRETING LOCATION: [address] [place] [address], [facility]        The radiologist's interpretation of all radiological studies have been reviewed by me.    COURSE & MEDICAL DECISION MAKING  Pertinent Labs & Imaging studies reviewed. (See chart for details)    8:42 PM - Patient seen and examined at bedside. Ordered EKG, DX-Chest, lipase, troponin, CBC with differential, CMP and proBrain Natriuretic peptide to evaluate his symptoms. BNP elevated to 5,000 from 2,000 six days ago. Waiting on pro-parmjit and LA but will likely need admission for worsening SOB and increased BNP.      11:30 PM - Patient was reevaluated at bedside. Discussed lab and radiology results with the patient.    11:50 PM - I discussed the patient's case and the above findings with Dr. Lizama (Hospitalist) who agrees to admit the patient.    Decision Making:  This is a 64 y.o. year old male who presents with worsening SOB and chest pain.  Patient did not appear to be in  acute distress on initial evaluation and his vital signs were reassuring.  His story is somewhat concerning and his EKG does have T wave abnormalities in the anterior lateral leads.  His first troponin was elevated at 33 down from his most recent one a couple of days ago.  His BNP, however has increased significantly.  Chest x-ray did not show any obvious pleural effusions or pulmonary edema and his mediastinum was reassuring.  He recently had a CTA of the chest which was notable for AAA and no evidence of PE.  I am less concerned for pulmonary embolism at this time especially given his significant underlying COPD.  He was treated with Solu-Medrol and a DuoNeb.  Breath sounds were improved on reevaluation.  White count, lactic acid, and procalcitonin were all normal.  I have very low clinical suspicion for sepsis and no focal opacities were noted on chest x-ray.  The patient has been unable to fill his medications and is currently homeless.  Given his significant medical problems and poor social situation, the plan was made for admission. At this time, I suspect his symptoms are likely secondary to his COPD but he will need his troponins trended and his blood pressure needs to be under better control.  The patient remained stable while in the emergency department.    DISPOSITION:  Patient will be admitted to Dr. Lizama in guarded condition.    FINAL IMPRESSION  1. Acute exacerbation of chronic obstructive pulmonary disease (COPD) (HCC)    2. Poorly-controlled hypertension    3. Anemia, unspecified type    4. Prolonged QT interval    5. COPD with exacerbation (HCC)          Rip ARRINGTON (Jamie), am scribing for, and in the presence of, Birdie Martinez D.O..    Electronically signed by: Rip Al (Jamie), 9/26/2019    Birdie ARRINGTON D.O. personally performed the services described in this documentation, as scribed by Rip Al in my presence, and it is both accurate and complete.C.    The note accurately  reflects work and decisions made by me.  Birdie Martinez  9/27/2019  4:21 AM

## 2019-09-27 NOTE — PROGRESS NOTES
Patient was seen at bedside and chart was reviewed. Please see Dr. Lizama's note for further details.  65 yo homeless man with COPD, HTN, EtOH abuse, hpp C, chronic suboxone for IV heroin use who presented with 2 weeks of SOB and now with chest pain. He was recently hospitalized and discharged 4 days prior for SOB and had CTA chest that was negative for PE, TTE showed normal EF with mod AI and mild TR and treated for COPD exacerbation.   HTN improved  Trops are flat.   He complains of back pain.   Has been staying shelter.  Continue COPD treatment.

## 2019-09-27 NOTE — ASSESSMENT & PLAN NOTE
Greater than 3 minutes spent with the patient smoking cessation counseling. Discussed cardiovascular risk factors of smoking. Nicotine patch and gum ordered. He has no desire to quit at this time.

## 2019-09-27 NOTE — ASSESSMENT & PLAN NOTE
This is associated with elevated troponin and EKG with out acute changes  He had extensive workup last admission including echo which was unremarkbale   Serial trops plateued

## 2019-09-27 NOTE — ASSESSMENT & PLAN NOTE
Suspect acute COPD exacerbation with assocaited shortness of breath   Still using tobacco  Cont with IV solumedrol followed by PO prednisone  Duo nebs   Pulmicort  Smoking cessation counseling

## 2019-09-28 ENCOUNTER — HOSPITAL ENCOUNTER (OUTPATIENT)
Dept: RADIOLOGY | Facility: MEDICAL CENTER | Age: 64
End: 2019-09-28
Attending: HOSPITALIST
Payer: MEDICARE

## 2019-09-28 VITALS
WEIGHT: 145.06 LBS | TEMPERATURE: 98.5 F | OXYGEN SATURATION: 96 % | BODY MASS INDEX: 22.77 KG/M2 | HEART RATE: 72 BPM | SYSTOLIC BLOOD PRESSURE: 128 MMHG | RESPIRATION RATE: 18 BRPM | HEIGHT: 67 IN | DIASTOLIC BLOOD PRESSURE: 63 MMHG

## 2019-09-28 LAB
ALBUMIN SERPL BCP-MCNC: 3.3 G/DL (ref 3.2–4.9)
ALBUMIN/GLOB SERPL: 0.9 G/DL
ALP SERPL-CCNC: 165 U/L (ref 30–99)
ALT SERPL-CCNC: 34 U/L (ref 2–50)
ANION GAP SERPL CALC-SCNC: 9 MMOL/L (ref 0–11.9)
AST SERPL-CCNC: 47 U/L (ref 12–45)
BASOPHILS # BLD AUTO: 0.2 % (ref 0–1.8)
BASOPHILS # BLD: 0.03 K/UL (ref 0–0.12)
BILIRUB SERPL-MCNC: 1.5 MG/DL (ref 0.1–1.5)
BUN SERPL-MCNC: 42 MG/DL (ref 8–22)
CALCIUM SERPL-MCNC: 9 MG/DL (ref 8.5–10.5)
CHLORIDE SERPL-SCNC: 98 MMOL/L (ref 96–112)
CO2 SERPL-SCNC: 21 MMOL/L (ref 20–33)
CREAT SERPL-MCNC: 1.32 MG/DL (ref 0.5–1.4)
EOSINOPHIL # BLD AUTO: 0 K/UL (ref 0–0.51)
EOSINOPHIL NFR BLD: 0 % (ref 0–6.9)
ERYTHROCYTE [DISTWIDTH] IN BLOOD BY AUTOMATED COUNT: 53.1 FL (ref 35.9–50)
GLOBULIN SER CALC-MCNC: 3.5 G/DL (ref 1.9–3.5)
GLUCOSE SERPL-MCNC: 213 MG/DL (ref 65–99)
HCT VFR BLD AUTO: 34.8 % (ref 42–52)
HGB BLD-MCNC: 12 G/DL (ref 14–18)
IMM GRANULOCYTES # BLD AUTO: 0.15 K/UL (ref 0–0.11)
IMM GRANULOCYTES NFR BLD AUTO: 1 % (ref 0–0.9)
LYMPHOCYTES # BLD AUTO: 0.55 K/UL (ref 1–4.8)
LYMPHOCYTES NFR BLD: 3.6 % (ref 22–41)
MCH RBC QN AUTO: 35.6 PG (ref 27–33)
MCHC RBC AUTO-ENTMCNC: 34.5 G/DL (ref 33.7–35.3)
MCV RBC AUTO: 103.3 FL (ref 81.4–97.8)
MONOCYTES # BLD AUTO: 0.82 K/UL (ref 0–0.85)
MONOCYTES NFR BLD AUTO: 5.4 % (ref 0–13.4)
NEUTROPHILS # BLD AUTO: 13.76 K/UL (ref 1.82–7.42)
NEUTROPHILS NFR BLD: 89.8 % (ref 44–72)
NRBC # BLD AUTO: 0 K/UL
NRBC BLD-RTO: 0 /100 WBC
PLATELET # BLD AUTO: 168 K/UL (ref 164–446)
PMV BLD AUTO: 11.4 FL (ref 9–12.9)
POTASSIUM SERPL-SCNC: 4.6 MMOL/L (ref 3.6–5.5)
PROT SERPL-MCNC: 6.8 G/DL (ref 6–8.2)
RBC # BLD AUTO: 3.37 M/UL (ref 4.7–6.1)
SODIUM SERPL-SCNC: 128 MMOL/L (ref 135–145)
WBC # BLD AUTO: 15.3 K/UL (ref 4.8–10.8)

## 2019-09-28 PROCEDURE — 700101 HCHG RX REV CODE 250: Performed by: HOSPITALIST

## 2019-09-28 PROCEDURE — G0378 HOSPITAL OBSERVATION PER HR: HCPCS

## 2019-09-28 PROCEDURE — 700102 HCHG RX REV CODE 250 W/ 637 OVERRIDE(OP): Performed by: HOSPITALIST

## 2019-09-28 PROCEDURE — 99225 PR SUBSEQUENT OBSERVATION CARE,LEVEL II: CPT | Performed by: INTERNAL MEDICINE

## 2019-09-28 PROCEDURE — A9270 NON-COVERED ITEM OR SERVICE: HCPCS | Performed by: HOSPITALIST

## 2019-09-28 PROCEDURE — 96376 TX/PRO/DX INJ SAME DRUG ADON: CPT

## 2019-09-28 PROCEDURE — 80053 COMPREHEN METABOLIC PANEL: CPT

## 2019-09-28 PROCEDURE — A9270 NON-COVERED ITEM OR SERVICE: HCPCS | Performed by: INTERNAL MEDICINE

## 2019-09-28 PROCEDURE — 36415 COLL VENOUS BLD VENIPUNCTURE: CPT

## 2019-09-28 PROCEDURE — 700111 HCHG RX REV CODE 636 W/ 250 OVERRIDE (IP): Performed by: HOSPITALIST

## 2019-09-28 PROCEDURE — 700102 HCHG RX REV CODE 250 W/ 637 OVERRIDE(OP): Performed by: INTERNAL MEDICINE

## 2019-09-28 PROCEDURE — 85025 COMPLETE CBC W/AUTO DIFF WBC: CPT

## 2019-09-28 PROCEDURE — 94640 AIRWAY INHALATION TREATMENT: CPT

## 2019-09-28 PROCEDURE — 96372 THER/PROPH/DIAG INJ SC/IM: CPT

## 2019-09-28 RX ORDER — SODIUM CHLORIDE 9 MG/ML
INJECTION, SOLUTION INTRAVENOUS CONTINUOUS
Status: DISCONTINUED | OUTPATIENT
Start: 2019-09-28 | End: 2019-09-28 | Stop reason: HOSPADM

## 2019-09-28 RX ADMIN — IPRATROPIUM BROMIDE AND ALBUTEROL SULFATE 3 ML: .5; 3 SOLUTION RESPIRATORY (INHALATION) at 07:55

## 2019-09-28 RX ADMIN — BUPRENORPHINE HYDROCHLORIDE AND NALOXONE HYDROCHLORIDE DIHYDRATE 1 TABLET: 8; 2 TABLET SUBLINGUAL at 05:17

## 2019-09-28 RX ADMIN — LORAZEPAM 1 MG: 1 TABLET ORAL at 03:00

## 2019-09-28 RX ADMIN — LISINOPRIL 10 MG: 10 TABLET ORAL at 05:18

## 2019-09-28 RX ADMIN — HEPARIN SODIUM 5000 UNITS: 5000 INJECTION INTRAVENOUS; SUBCUTANEOUS at 05:17

## 2019-09-28 RX ADMIN — ASPIRIN 81 MG 81 MG: 81 TABLET ORAL at 05:18

## 2019-09-28 RX ADMIN — LORAZEPAM 4 MG: 2 TABLET ORAL at 06:36

## 2019-09-28 RX ADMIN — IPRATROPIUM BROMIDE AND ALBUTEROL SULFATE 3 ML: .5; 3 SOLUTION RESPIRATORY (INHALATION) at 02:41

## 2019-09-28 RX ADMIN — CLONIDINE HYDROCHLORIDE 0.3 MG: 0.1 TABLET ORAL at 05:17

## 2019-09-28 RX ADMIN — SENNOSIDES, DOCUSATE SODIUM 2 TABLET: 50; 8.6 TABLET, FILM COATED ORAL at 05:18

## 2019-09-28 RX ADMIN — BUDESONIDE 0.25 MG: 0.25 SUSPENSION RESPIRATORY (INHALATION) at 09:12

## 2019-09-28 RX ADMIN — METHYLPREDNISOLONE SODIUM SUCCINATE 40 MG: 40 INJECTION, POWDER, FOR SOLUTION INTRAMUSCULAR; INTRAVENOUS at 00:19

## 2019-09-28 RX ADMIN — NICOTINE 14 MG: 14 PATCH TRANSDERMAL at 05:18

## 2019-09-28 RX ADMIN — LORAZEPAM 2 MG: 2 TABLET ORAL at 00:20

## 2019-09-28 ASSESSMENT — LIFESTYLE VARIABLES
HEADACHE, FULLNESS IN HEAD: NOT PRESENT
AUDITORY DISTURBANCES: NOT PRESENT
HEADACHE, FULLNESS IN HEAD: MILD
ORIENTATION AND CLOUDING OF SENSORIUM: ORIENTED AND CAN DO SERIAL ADDITIONS
VISUAL DISTURBANCES: MODERATE SENSITIVITY
AGITATION: SOMEWHAT MORE THAN NORMAL ACTIVITY
PAROXYSMAL SWEATS: NO SWEAT VISIBLE
ORIENTATION AND CLOUDING OF SENSORIUM: ORIENTED AND CAN DO SERIAL ADDITIONS
TOTAL SCORE: 9
HEADACHE, FULLNESS IN HEAD: MILD
VISUAL DISTURBANCES: NOT PRESENT
TOTAL SCORE: 28
NAUSEA AND VOMITING: NO NAUSEA AND NO VOMITING
NAUSEA AND VOMITING: MILD NAUSEA WITH NO VOMITING
ORIENTATION AND CLOUDING OF SENSORIUM: ORIENTED AND CAN DO SERIAL ADDITIONS
ANXIETY: *
HEADACHE, FULLNESS IN HEAD: NOT PRESENT
PAROXYSMAL SWEATS: BARELY PERCEPTIBLE SWEATING. PALMS MOIST
TREMOR: *
AUDITORY DISTURBANCES: NOT PRESENT
TOTAL SCORE: 0
NAUSEA AND VOMITING: *
ANXIETY: MILDLY ANXIOUS
ANXIETY: NO ANXIETY (AT EASE)
TOTAL SCORE: 13
PAROXYSMAL SWEATS: *
AGITATION: NORMAL ACTIVITY
HEADACHE, FULLNESS IN HEAD: MODERATELY SEVERE
ANXIETY: *
TREMOR: TREMOR NOT VISIBLE BUT CAN BE FELT, FINGERTIP TO FINGERTIP
AUDITORY DISTURBANCES: NOT PRESENT
PAROXYSMAL SWEATS: NO SWEAT VISIBLE
VISUAL DISTURBANCES: MILD SENSITIVITY
VISUAL DISTURBANCES: NOT PRESENT
ORIENTATION AND CLOUDING OF SENSORIUM: ORIENTED AND CAN DO SERIAL ADDITIONS
NAUSEA AND VOMITING: NO NAUSEA AND NO VOMITING
TREMOR: NO TREMOR
AGITATION: SOMEWHAT MORE THAN NORMAL ACTIVITY
AUDITORY DISTURBANCES: NOT PRESENT
NAUSEA AND VOMITING: MILD NAUSEA WITH NO VOMITING
TREMOR: *
TOTAL SCORE: 0
ANXIETY: NO ANXIETY (AT EASE)
AUDITORY DISTURBANCES: NOT PRESENT
AGITATION: NORMAL ACTIVITY
VISUAL DISTURBANCES: MODERATELY SEVERE HALLUCINATIONS
ORIENTATION AND CLOUDING OF SENSORIUM: ORIENTED AND CAN DO SERIAL ADDITIONS
TREMOR: NO TREMOR
AGITATION: *
PAROXYSMAL SWEATS: BARELY PERCEPTIBLE SWEATING. PALMS MOIST

## 2019-09-28 ASSESSMENT — ENCOUNTER SYMPTOMS
HEADACHES: 1
MYALGIAS: 0
DIAPHORESIS: 1
SHORTNESS OF BREATH: 0
ABDOMINAL PAIN: 0
TREMORS: 1
NERVOUS/ANXIOUS: 1
NAUSEA: 0

## 2019-09-28 NOTE — CARE PLAN
Problem: Safety  Goal: Will remain free from injury  Outcome: PROGRESSING AS EXPECTED  Note:   Verified that safety precautions are in place and education provided to pt on fall safety and utilization of call button       Problem: Knowledge Deficit  Goal: Knowledge of disease process/condition, treatment plan, diagnostic tests, and medications will improve  Outcome: PROGRESSING AS EXPECTED  Note:   Pt updated on POC, tests, and medications. Pt verbalizes understanding and has no further questions at this time. Pt educated on calling for any more questions.        Problem: Respiratory:  Goal: Respiratory status will improve  Outcome: PROGRESSING AS EXPECTED

## 2019-09-28 NOTE — PROGRESS NOTES
"Pt got up and out of bed turning off strip alarm. RN walked into room to find the pt naked, without telebox on throwing his belongings around the room after he peed on the floor in the corner. Pt angrily reports that he is \"going to get a beer\". Pt redirectable, CIWA scored and pt medicated per orders. Pt placed back on the tele box and got back into bed. Pt requested \"ativan, diazepam, clonidine, saboxone, a pain pill and a beer\".     0715 Report given to day shift RN. Pt in bed with continuous pulse ox in place sleeping with unlabored breathing. Strip alarm in place and functioning properly. Bed in low and locked position, call button within reach and fall precautions are in place  "

## 2019-09-28 NOTE — PROGRESS NOTES
Bedside report received from day shift RN. Pt alert sitting up in bed with no complaints of pain. No signs or symptoms of resp distress noted or reported on RA. Pt continues to have a wet productive cough with yellow sputum. Bed in low and locked position, call button within reach and fall precautions are in place

## 2019-09-28 NOTE — PROGRESS NOTES
Dr Barajas notified of increased WBC and low sodium. Pt is asymptomatic at this time. No new orders. RN will continue to monitor

## 2019-09-28 NOTE — PROGRESS NOTES
Pt in bed sleeping, wakes easily to voice. No signs or symptoms of pain or resp distress on RA. Bed in low and locked position, call button within reach and fall precautions are in place

## 2019-09-28 NOTE — DISCHARGE SUMMARY
Discharge Summary    CHIEF COMPLAINT ON ADMISSION  Chief Complaint   Patient presents with   • Shortness of Breath   • Cough       Reason for Admission  Cough     Admission Date  9/26/2019    CODE STATUS  Prior    HPI & HOSPITAL COURSE  63 yo homeless man with COPD, HTN, EtOH abuse, h ep C, tobacco use, chronic suboxone for IV heroin use who presented with 2 weeks of SOB and now with chest pain. He says he was still smoking tobacco. He was recently hospitalized and discharged 4 days prior for SOB and had CTA chest that was negative for PE, TTE showed normal EF with mod AI and mild TR and treated for COPD exacerbation.    The next day he wanted to leave AMA because he was going into withdrawal. He was receiving ativan for his withdrawal but refused further medication. He says he just wants to leave. He was stable on room air and already have inhalers from his last hospitalization.      DISCHARGE DIAGNOSES  Principal Problem:    COPD with exacerbation (HCC) POA: Yes  Active Problems:    Transaminitis POA: Yes    Chronic hepatitis C without hepatic coma (HCC) POA: Yes    Homelessness POA: Yes    Pain in the chest POA: Unknown    Anemia POA: Unknown    Hypokalemia POA: Unknown    Alcohol dependence with unspecified alcohol-induced disorder (HCC) POA: Yes    Tobacco abuse POA: Yes    History of heroin abuse POA: Yes  Resolved Problems:    * No resolved hospital problems. *      FOLLOW UP  No follow-up provider specified.    MEDICATIONS ON DISCHARGE     Medication List      ASK your doctor about these medications      Instructions   albuterol 108 (90 Base) MCG/ACT Aers inhalation aerosol   Inhale 2 Puffs by mouth every four hours as needed.  Dose:  2 Puff     budesonide-formoterol 80-4.5 MCG/ACT Aero  Commonly known as:  SYMBICORT   Inhale 2 Puffs by mouth 2 Times a Day.  Dose:  2 Puff     buprenorphine-naloxone 8-2 MG Subl  Commonly known as:  SUBOXONE   Place 1 Tab under tongue 2 Times a Day.  Dose:  1 Tab     cloNIDine  0.3 MG Tabs  Commonly known as:  CATAPRESS   Take 0.3 mg by mouth 2 times a day.  Dose:  0.3 mg     gabapentin 600 MG tablet  Commonly known as:  NEURONTIN   Take 600 mg by mouth 3 times a day.  Dose:  600 mg     lisinopril 10 MG Tabs  Commonly known as:  PRINIVIL   Take 10 mg by mouth every day.  Dose:  10 mg     MULTI VITAMIN PO   Take 1 Tab by mouth every day.  Dose:  1 Tab     VITAMIN B COMPLEX PO   Take 1 Tab by mouth every day.  Dose:  1 Tab            Allergies  No Known Allergies    DIET  No orders of the defined types were placed in this encounter.      ACTIVITY  As tolerated.  Weight bearing as tolerated    CONSULTATIONS  None    PROCEDURES  DX-CHEST-LIMITED (1 VIEW)   Final Result      No acute cardiopulmonary abnormality. No change.               INTERPRETING LOCATION: [address] [place] [address], [id number]            LABORATORY  Lab Results   Component Value Date    SODIUM 128 (L) 09/28/2019    POTASSIUM 4.6 09/28/2019    CHLORIDE 98 09/28/2019    CO2 21 09/28/2019    GLUCOSE 213 (H) 09/28/2019    BUN 42 (H) 09/28/2019    CREATININE 1.32 09/28/2019        Lab Results   Component Value Date    WBC 15.3 (H) 09/28/2019    HEMOGLOBIN 12.0 (L) 09/28/2019    HEMATOCRIT 34.8 (L) 09/28/2019    PLATELETCT 168 09/28/2019

## 2019-09-28 NOTE — PROGRESS NOTES
Hospital Medicine Daily Progress Note    Date of Service  9/28/2019    Chief Complaint  64 y.o. male admitted 9/26/2019 with SOB.    Hospital Course    63 yo homeless man with COPD, HTN, EtOH abuse, hep C, tobacco use, chronic suboxone for IV heroin use who presented with 2 weeks of SOB and now with chest pain. He says he was still smoking tobacco. He was recently hospitalized and discharged 4 days prior for SOB and had CTA chest that was negative for PE, TTE showed normal EF with mod AI and mild TR and treated for COPD exacerbation.       Interval Problem Update  He says he's done with smoking  Na 128, creat increased  CIWA mild to mod scores  He says his SOB is improved    Consultants/Specialty  none    Code Status  Prior      Disposition  Staying at shelter    Review of Systems  Review of Systems   Constitutional: Positive for diaphoresis and malaise/fatigue.   Respiratory: Negative for shortness of breath.    Cardiovascular: Negative for chest pain.   Gastrointestinal: Negative for abdominal pain and nausea.   Musculoskeletal: Negative for myalgias.   Neurological: Positive for tremors and headaches.   Psychiatric/Behavioral: The patient is nervous/anxious.         Physical Exam  Temp:  [36.8 °C (98.2 °F)-37.3 °C (99.1 °F)] 36.9 °C (98.5 °F)  Pulse:  [68-98] 72  Resp:  [16-18] 18  BP: (116-132)/(63-76) 128/63  SpO2:  [92 %-96 %] 96 %    Physical Exam   Constitutional: He is oriented to person, place, and time. He appears well-developed. No distress.   HENT:   Head: Normocephalic.   Mouth/Throat: Oropharynx is clear and moist.   Eyes: Conjunctivae are normal.   Cardiovascular: Normal rate and regular rhythm.   Pulmonary/Chest: Effort normal. He has no wheezes. He has no rales.   Abdominal: Soft. There is no tenderness.   Musculoskeletal: He exhibits no edema.   Neurological: He is alert and oriented to person, place, and time.   Skin: Skin is warm and dry. He is not diaphoretic.   Nursing note and vitals  reviewed.      Fluids  No intake or output data in the 24 hours ending 09/28/19 1639    Laboratory  Recent Labs     09/26/19 2126 09/28/19  0329   WBC 9.5 15.3*   RBC 3.64* 3.37*   HEMOGLOBIN 12.6* 12.0*   HEMATOCRIT 37.6* 34.8*   .3* 103.3*   MCH 34.6* 35.6*   MCHC 33.5* 34.5   RDW 54.2* 53.1*   PLATELETCT 174 168   MPV 11.1 11.4     Recent Labs     09/26/19 2126 09/28/19  0329   SODIUM 136 128*   POTASSIUM 3.3* 4.6   CHLORIDE 101 98   CO2 24 21   GLUCOSE 98 213*   BUN 15 42*   CREATININE 0.83 1.32   CALCIUM 8.8 9.0                   Imaging  DX-CHEST-LIMITED (1 VIEW)   Final Result      No acute cardiopulmonary abnormality. No change.               INTERPRETING LOCATION: [address] [place] [address], [facility], [facility]           Assessment/Plan  * COPD with exacerbation (HCC)- (present on admission)  Assessment & Plan  Suspect acute COPD exacerbation with assocaited shortness of breath   Still using tobacco  Cont with IV solumedrol followed by PO prednisone  Duo nebs   Pulmicort  Smoking cessation counseling     Transaminitis- (present on admission)  Assessment & Plan  This is chronic, ZEPEDA noted on last RUQ ultarsound     Hypokalemia  Assessment & Plan  Replace and recheck labs    Anemia  Assessment & Plan  Lab workup ordered    Pain in the chest  Assessment & Plan  This is associated with elevated troponin and EKG with out acute changes  He had extensive workup last admission including echo which was unremarkbale   Serial trops plateued    Homelessness- (present on admission)  Assessment & Plan  Needs social work input for medications    Chronic hepatitis C without hepatic coma (HCC)- (present on admission)  Assessment & Plan  Hx of untreated    History of heroin abuse- (present on admission)  Assessment & Plan  On saboxone     Tobacco abuse- (present on admission)  Assessment & Plan  Greater than 3 minutes spent with the patient smoking cessation counseling. Discussed cardiovascular risk factors of smoking. Nicotine patch  and gum ordered. He has no desire to quit at this time.     Alcohol dependence with unspecified alcohol-induced disorder (HCC)- (present on admission)  Assessment & Plan  Monitor for withdrawals, states he only drinks 2 beers a day. Encouraged cessation        VTE prophylaxis: scds

## 2021-03-03 DIAGNOSIS — Z23 NEED FOR VACCINATION: ICD-10-CM

## 2021-11-16 NOTE — ASSESSMENT & PLAN NOTE
Patient not honest regarding his underlying alcohol abuse  We will give x1 IV thiamine  [facility] protocol for monitoring of withdrawal, supplementation of benzodiazepines per [facility] protocol  Counseled and educated  Multivitamin support  Monitor electrolytes   no hoarseness/no difficulty in swallowing/no pain